# Patient Record
Sex: MALE | Race: WHITE | Employment: FULL TIME | URBAN - METROPOLITAN AREA
[De-identification: names, ages, dates, MRNs, and addresses within clinical notes are randomized per-mention and may not be internally consistent; named-entity substitution may affect disease eponyms.]

---

## 2017-04-21 ENCOUNTER — TELEPHONE (OUTPATIENT)
Dept: INTERNAL MEDICINE CLINIC | Facility: CLINIC | Age: 37
End: 2017-04-21

## 2017-04-21 ENCOUNTER — OFFICE VISIT (OUTPATIENT)
Dept: INTERNAL MEDICINE CLINIC | Facility: CLINIC | Age: 37
End: 2017-04-21

## 2017-04-21 VITALS
WEIGHT: 230.81 LBS | BODY MASS INDEX: 35 KG/M2 | HEART RATE: 96 BPM | TEMPERATURE: 98 F | DIASTOLIC BLOOD PRESSURE: 88 MMHG | SYSTOLIC BLOOD PRESSURE: 133 MMHG

## 2017-04-21 DIAGNOSIS — R10.13 EPIGASTRIC ABDOMINAL PAIN: Primary | ICD-10-CM

## 2017-04-21 PROCEDURE — 99212 OFFICE O/P EST SF 10 MIN: CPT | Performed by: INTERNAL MEDICINE

## 2017-04-21 PROCEDURE — 99214 OFFICE O/P EST MOD 30 MIN: CPT | Performed by: INTERNAL MEDICINE

## 2017-04-21 RX ORDER — FAMOTIDINE 40 MG/1
40 TABLET, FILM COATED ORAL DAILY
Qty: 30 TABLET | Refills: 2 | Status: SHIPPED | OUTPATIENT
Start: 2017-04-21 | End: 2018-04-11

## 2017-04-21 RX ORDER — FAMOTIDINE 40 MG/1
40 TABLET, FILM COATED ORAL NIGHTLY PRN
Qty: 10 TABLET | Refills: 0 | Status: SHIPPED | OUTPATIENT
Start: 2017-04-21 | End: 2020-12-18

## 2017-04-21 NOTE — TELEPHONE ENCOUNTER
I called the patient and recheck that the medication from oxygen was sent to the patient's pharmacy. Apparently he went to the wrong pharmacy. I resend again anyway.

## 2017-04-21 NOTE — PROGRESS NOTES
HPI:    Patient ID: Mina Spurling is a 40year old male. Pt presents to the clinic c/o epigastric abd pain. He had eaten chicken Monday 4/17 when he noted nausea, vomiting and diarrhea later that day.  He ate cream of broccoli soup and pizza this week Allergies   PHYSICAL EXAM:   Physical Exam   Constitutional: He appears well-developed and well-nourished. No distress. HENT:   Head: Normocephalic and atraumatic. Eyes: EOM are normal.   Neck: Normal range of motion.    Pulmonary/Chest: Effort normal. my direction and in my presence. I have reviewed the chart and discharge instructions (if applicable) and agree that the record reflects my personal performance and is accurate and complete.   Adelaida Szymanski MD, 4/21/2017, 4:22 PM

## 2017-07-20 ENCOUNTER — OFFICE VISIT (OUTPATIENT)
Dept: FAMILY MEDICINE CLINIC | Facility: CLINIC | Age: 37
End: 2017-07-20

## 2017-07-20 VITALS
WEIGHT: 220 LBS | TEMPERATURE: 98 F | HEART RATE: 91 BPM | OXYGEN SATURATION: 96 % | DIASTOLIC BLOOD PRESSURE: 78 MMHG | RESPIRATION RATE: 16 BRPM | HEIGHT: 68 IN | BODY MASS INDEX: 33.34 KG/M2 | SYSTOLIC BLOOD PRESSURE: 110 MMHG

## 2017-07-20 DIAGNOSIS — T78.40XA ALLERGIC REACTION, INITIAL ENCOUNTER: ICD-10-CM

## 2017-07-20 DIAGNOSIS — L50.9 HIVES: Primary | ICD-10-CM

## 2017-07-20 PROCEDURE — 99212 OFFICE O/P EST SF 10 MIN: CPT | Performed by: NURSE PRACTITIONER

## 2017-07-20 NOTE — PROGRESS NOTES
CHIEF COMPLAINT:   Patient presents with:  Derm Problem: Hives X 1 day         HPI:   Romel Kumari is a 40year old male who presents for evaluation of a rash. Per patient rash started in the past 1 days. Rash has been worsened since onset.   Patient 1 SKIN: Per HPI. No edema. No ulcerations. HEENT: Denies rhinorrhea, edema of the lips or swelling of throat. CARDIOVASCULAR: Denies chest pains or palpitations. LUNGS: Denies shortness of breath with exertion or rest. No cough or wheezing.   LYMPH: Denies Will treat with second generation antihistamine daily, bendryl at night as directed on packaging, and zantac daily.  Use as directed on packaging, See directions in patient instruction for bendaryl    Discussed with patient to stop offending agent, Do not i · Try to find the cause of the hives and eliminate it. Discuss possible causes with your healthcare provider. Future reactions to the same allergen may be worse. · Don’t scratch the hives. Scratching will delay healing.  To reduce itching, apply cool, wet Call 911 if any of the following occur:  · Swelling of the face, throat, or tongue  · Trouble breathing or swallowing  · Dizziness, weakness, or fainting  Date Last Reviewed: 9/1/2016  © 4498-5041 The [address] [place] [address], If you have hives, watch for symptoms of a severe reaction that affects your entire body. This is called anaphylaxis. Symptoms can include swollen areas of the body, wheezing, trouble breathing or swallowing, and a hoarse voice.  This reaction may happen ri

## 2017-07-20 NOTE — PATIENT INSTRUCTIONS
May take claritin or zyrtec daily    May use benadryl as directed on packaging: do not use while driving, operating heavy machinery, when focus is needed, or mix with alcohol or drugs    Take zantac daily    Do not scratch this can cause secondary infect · You may use over-the counter antihistamines to reduce itching. Some older antihistamines, such as diphenhydramine and chlorpheniramine, are inexpensive. But they need to be taken often and may make you sleepy. They are best used at bedtime.  Don’t use dip Urticaria (hives) are red, itchy, and swollen areas on the skin. They are most often an allergic reaction from eating a food or taking a medicine. Sometimes the cause may be unknown. A single hive can vary in size from a half inch to several inches.  Hives When to call 911  Call 911 right away if you have:  · Swelling in your lips, tongue, or throat, called angioedema  · Trouble breathing or swallowing   Date Last Reviewed: 9/20/2015  © 3524-6884 [address] Tabby Resendiz

## 2018-04-11 ENCOUNTER — OFFICE VISIT (OUTPATIENT)
Dept: FAMILY MEDICINE CLINIC | Facility: CLINIC | Age: 38
End: 2018-04-11

## 2018-04-11 VITALS
SYSTOLIC BLOOD PRESSURE: 128 MMHG | DIASTOLIC BLOOD PRESSURE: 84 MMHG | HEIGHT: 68 IN | BODY MASS INDEX: 36.13 KG/M2 | HEART RATE: 76 BPM | WEIGHT: 238.38 LBS

## 2018-04-11 DIAGNOSIS — Z30.09 VASECTOMY EVALUATION: ICD-10-CM

## 2018-04-11 DIAGNOSIS — H40.059 RAISED INTRAOCULAR PRESSURE, UNSPECIFIED LATERALITY: Primary | ICD-10-CM

## 2018-04-11 PROCEDURE — 99212 OFFICE O/P EST SF 10 MIN: CPT | Performed by: FAMILY MEDICINE

## 2018-04-11 PROCEDURE — 99213 OFFICE O/P EST LOW 20 MIN: CPT | Performed by: FAMILY MEDICINE

## 2018-04-11 RX ORDER — AMMONIUM LACTATE 12 G/100G
CREAM TOPICAL
Qty: 1 TUBE | Refills: 11 | Status: SHIPPED | OUTPATIENT
Start: 2018-04-11 | End: 2020-12-18

## 2018-04-11 NOTE — PROGRESS NOTES
Blood pressure 128/84, pulse 76, height 5' 8\" (1.727 m), weight 238 lb 6 oz (108.1 kg).     Patient presents today for initial visit was told he had elevated intraocular pressure to his optometrist.  He also is interested in his skin tag removal on his lef

## 2018-05-18 ENCOUNTER — OFFICE VISIT (OUTPATIENT)
Dept: FAMILY MEDICINE CLINIC | Facility: CLINIC | Age: 38
End: 2018-05-18

## 2018-05-18 ENCOUNTER — APPOINTMENT (OUTPATIENT)
Dept: LAB | Age: 38
End: 2018-05-18
Attending: FAMILY MEDICINE
Payer: COMMERCIAL

## 2018-05-18 VITALS
SYSTOLIC BLOOD PRESSURE: 133 MMHG | HEIGHT: 68 IN | DIASTOLIC BLOOD PRESSURE: 89 MMHG | WEIGHT: 242 LBS | BODY MASS INDEX: 36.68 KG/M2 | HEART RATE: 76 BPM

## 2018-05-18 DIAGNOSIS — E78.2 MIXED HYPERLIPIDEMIA: ICD-10-CM

## 2018-05-18 DIAGNOSIS — E55.9 VITAMIN D DEFICIENCY: ICD-10-CM

## 2018-05-18 DIAGNOSIS — Z00.00 ROUTINE PHYSICAL EXAMINATION: ICD-10-CM

## 2018-05-18 DIAGNOSIS — E55.9 VITAMIN D DEFICIENCY: Primary | ICD-10-CM

## 2018-05-18 PROCEDURE — 82306 VITAMIN D 25 HYDROXY: CPT

## 2018-05-18 PROCEDURE — 36415 COLL VENOUS BLD VENIPUNCTURE: CPT

## 2018-05-18 PROCEDURE — 84460 ALANINE AMINO (ALT) (SGPT): CPT

## 2018-05-18 PROCEDURE — 90471 IMMUNIZATION ADMIN: CPT | Performed by: FAMILY MEDICINE

## 2018-05-18 PROCEDURE — 90715 TDAP VACCINE 7 YRS/> IM: CPT | Performed by: FAMILY MEDICINE

## 2018-05-18 PROCEDURE — 84443 ASSAY THYROID STIM HORMONE: CPT

## 2018-05-18 PROCEDURE — 80061 LIPID PANEL: CPT

## 2018-05-18 PROCEDURE — 99395 PREV VISIT EST AGE 18-39: CPT | Performed by: FAMILY MEDICINE

## 2018-05-18 PROCEDURE — 84450 TRANSFERASE (AST) (SGOT): CPT

## 2018-05-18 NOTE — PROGRESS NOTES
Blood pressure 133/89, pulse 76, height 5' 8\" (1.727 m), weight 242 lb (109.8 kg). REASON FOR VISIT:    Chidi Raphael is a 45year old male who presents for an [address]. There is no problem list on file for this patient.     Gen Known Allergies  CURRENT MEDICATIONS:     Current Outpatient Prescriptions:  Ammonium Lactate (LAC-HYDRIN) 12 % External Cream APPLY TWICE DAILY Disp: 1 Tube Rfl: 11   famotidine 40 MG Oral Tab Take 1 tablet (40 mg total) by mouth nightly as needed for Hea tenderness  : two descended testes, no masses, no hernia, no penile lesions  RECTAL: deferred  MUSCULOSKELETAL: back is not tender, FROM of the back  EXTREMITIES: no cyanosis, clubbing or edema  NEURO: Oriented times three, cranial nerves are intact, mot VITAMIN D, 25-HYDROXY; Future

## 2018-05-25 ENCOUNTER — OFFICE VISIT (OUTPATIENT)
Dept: FAMILY MEDICINE CLINIC | Facility: CLINIC | Age: 38
End: 2018-05-25

## 2018-05-25 ENCOUNTER — APPOINTMENT (OUTPATIENT)
Dept: LAB | Age: 38
End: 2018-05-25
Attending: FAMILY MEDICINE
Payer: COMMERCIAL

## 2018-05-25 VITALS
HEART RATE: 81 BPM | BODY MASS INDEX: 37 KG/M2 | DIASTOLIC BLOOD PRESSURE: 85 MMHG | TEMPERATURE: 98 F | WEIGHT: 242 LBS | SYSTOLIC BLOOD PRESSURE: 138 MMHG

## 2018-05-25 DIAGNOSIS — E66.9 CLASS 2 OBESITY WITHOUT SERIOUS COMORBIDITY IN ADULT, UNSPECIFIED BMI, UNSPECIFIED OBESITY TYPE: ICD-10-CM

## 2018-05-25 DIAGNOSIS — R74.01 ELEVATED TRANSAMINASE LEVEL: Primary | ICD-10-CM

## 2018-05-25 DIAGNOSIS — R74.01 ELEVATED TRANSAMINASE LEVEL: ICD-10-CM

## 2018-05-25 DIAGNOSIS — E78.2 MIXED HYPERLIPIDEMIA: ICD-10-CM

## 2018-05-25 PROBLEM — E66.812 CLASS 2 OBESITY WITHOUT SERIOUS COMORBIDITY IN ADULT: Status: ACTIVE | Noted: 2018-05-25

## 2018-05-25 PROCEDURE — 99212 OFFICE O/P EST SF 10 MIN: CPT | Performed by: FAMILY MEDICINE

## 2018-05-25 PROCEDURE — 86706 HEP B SURFACE ANTIBODY: CPT

## 2018-05-25 PROCEDURE — 82728 ASSAY OF FERRITIN: CPT

## 2018-05-25 PROCEDURE — 99213 OFFICE O/P EST LOW 20 MIN: CPT | Performed by: FAMILY MEDICINE

## 2018-05-25 PROCEDURE — 87340 HEPATITIS B SURFACE AG IA: CPT

## 2018-05-25 PROCEDURE — 80500 HEPATITIS A B + C PROFILE: CPT

## 2018-05-25 PROCEDURE — 83540 ASSAY OF IRON: CPT

## 2018-05-25 PROCEDURE — 86704 HEP B CORE ANTIBODY TOTAL: CPT

## 2018-05-25 PROCEDURE — 86803 HEPATITIS C AB TEST: CPT

## 2018-05-25 PROCEDURE — 86708 HEPATITIS A ANTIBODY: CPT

## 2018-05-25 PROCEDURE — 84466 ASSAY OF TRANSFERRIN: CPT

## 2018-05-25 PROCEDURE — 36415 COLL VENOUS BLD VENIPUNCTURE: CPT

## 2018-05-25 RX ORDER — PHENTERMINE HYDROCHLORIDE 37.5 MG/1
37.5 TABLET ORAL
Qty: 30 TABLET | Refills: 0 | Status: SHIPPED | OUTPATIENT
Start: 2018-05-25 | End: 2018-07-06

## 2018-05-25 NOTE — PROGRESS NOTES
Blood pressure 138/85, pulse 81, temperature 98 °F (36.7 °C), temperature source Oral, weight 242 lb (109.8 kg). Patient presents today following up for blood test results. Elevated transaminases noted and decreased vitamin D level with hyperlipidemia.

## 2018-06-01 ENCOUNTER — OFFICE VISIT (OUTPATIENT)
Dept: FAMILY MEDICINE CLINIC | Facility: CLINIC | Age: 38
End: 2018-06-01

## 2018-06-01 VITALS
WEIGHT: 242 LBS | HEART RATE: 99 BPM | DIASTOLIC BLOOD PRESSURE: 83 MMHG | BODY MASS INDEX: 36.68 KG/M2 | HEIGHT: 68 IN | SYSTOLIC BLOOD PRESSURE: 129 MMHG

## 2018-06-01 DIAGNOSIS — E66.9 CLASS 2 OBESITY WITHOUT SERIOUS COMORBIDITY IN ADULT, UNSPECIFIED BMI, UNSPECIFIED OBESITY TYPE: ICD-10-CM

## 2018-06-01 DIAGNOSIS — E78.2 MIXED HYPERLIPIDEMIA: ICD-10-CM

## 2018-06-01 DIAGNOSIS — R74.01 ELEVATED TRANSAMINASE LEVEL: Primary | ICD-10-CM

## 2018-06-01 PROCEDURE — 99212 OFFICE O/P EST SF 10 MIN: CPT | Performed by: FAMILY MEDICINE

## 2018-06-01 PROCEDURE — 99213 OFFICE O/P EST LOW 20 MIN: CPT | Performed by: FAMILY MEDICINE

## 2018-06-01 NOTE — PROGRESS NOTES
Blood pressure 129/83, pulse 99, height 5' 8\" (1.727 m), weight 242 lb (109.8 kg). Patient presents today following up for fatty liver and hyperlipidemia. No side effects from the phentermine except for dry mouth which is tolerable.   Patient would [facility]

## 2018-06-07 ENCOUNTER — HOSPITAL ENCOUNTER (OUTPATIENT)
Dept: ULTRASOUND IMAGING | Age: 38
Discharge: HOME | End: 2018-06-07
Attending: FAMILY MEDICINE
Payer: COMMERCIAL

## 2018-06-07 DIAGNOSIS — R74.01 ELEVATED TRANSAMINASE LEVEL: ICD-10-CM

## 2018-06-07 DIAGNOSIS — R79.89 ELEVATED FERRITIN: Primary | ICD-10-CM

## 2018-06-07 PROCEDURE — 76700 US EXAM ABDOM COMPLETE: CPT | Performed by: FAMILY MEDICINE

## 2018-07-06 ENCOUNTER — OFFICE VISIT (OUTPATIENT)
Dept: FAMILY MEDICINE CLINIC | Facility: CLINIC | Age: 38
End: 2018-07-06

## 2018-07-06 ENCOUNTER — TELEPHONE (OUTPATIENT)
Dept: FAMILY MEDICINE CLINIC | Facility: CLINIC | Age: 38
End: 2018-07-06

## 2018-07-06 VITALS
HEIGHT: 68 IN | DIASTOLIC BLOOD PRESSURE: 90 MMHG | HEART RATE: 125 BPM | RESPIRATION RATE: 18 BRPM | SYSTOLIC BLOOD PRESSURE: 140 MMHG | BODY MASS INDEX: 36.98 KG/M2 | WEIGHT: 244 LBS

## 2018-07-06 DIAGNOSIS — E66.9 CLASS 2 OBESITY WITHOUT SERIOUS COMORBIDITY IN ADULT, UNSPECIFIED BMI, UNSPECIFIED OBESITY TYPE: Primary | ICD-10-CM

## 2018-07-06 PROCEDURE — 99212 OFFICE O/P EST SF 10 MIN: CPT | Performed by: FAMILY MEDICINE

## 2018-07-06 PROCEDURE — 99213 OFFICE O/P EST LOW 20 MIN: CPT | Performed by: FAMILY MEDICINE

## 2018-07-06 RX ORDER — PHENTERMINE HYDROCHLORIDE 37.5 MG/1
37.5 TABLET ORAL
Qty: 30 TABLET | Refills: 0 | Status: SHIPPED | OUTPATIENT
Start: 2018-07-06 | End: 2019-04-26

## 2018-07-06 NOTE — PROGRESS NOTES
Blood pressure 140/90, pulse (!) 125, resp. rate 18, height 5' 8\" (1.727 m), weight 244 lb (110.7 kg). Following up for obesity. Denies any symptoms at this point. He got sick  in [place] and discontinue the medication for a while.   He feels fine a

## 2018-07-06 NOTE — TELEPHONE ENCOUNTER
PLEASE CONTACT PATIENT AND ADVISE HIM TO COME TO OFFICE FOR RN VISIT FOR PULSE CHECK WITH RN WHILE ON PHENTERMINE.

## 2018-07-07 NOTE — TELEPHONE ENCOUNTER
LMTCB-Ext [id number]. If pt calls back please help assist with scheduling a nurse visit to check pulse.

## 2018-07-11 ENCOUNTER — OFFICE VISIT (OUTPATIENT)
Dept: FAMILY MEDICINE CLINIC | Facility: CLINIC | Age: 38
End: 2018-07-11

## 2018-07-11 ENCOUNTER — HOSPITAL ENCOUNTER (OUTPATIENT)
Dept: GENERAL RADIOLOGY | Facility: HOSPITAL | Age: 38
Discharge: HOME | End: 2018-07-11
Attending: FAMILY MEDICINE
Payer: COMMERCIAL

## 2018-07-11 ENCOUNTER — LAB ENCOUNTER (OUTPATIENT)
Dept: LAB | Facility: HOSPITAL | Age: 38
End: 2018-07-11
Attending: FAMILY MEDICINE
Payer: COMMERCIAL

## 2018-07-11 VITALS
BODY MASS INDEX: 36.22 KG/M2 | HEIGHT: 68 IN | DIASTOLIC BLOOD PRESSURE: 82 MMHG | HEART RATE: 82 BPM | RESPIRATION RATE: 20 BRPM | WEIGHT: 239 LBS | OXYGEN SATURATION: 96 % | SYSTOLIC BLOOD PRESSURE: 133 MMHG

## 2018-07-11 DIAGNOSIS — K06.8 BLEEDING GUMS: Primary | ICD-10-CM

## 2018-07-11 DIAGNOSIS — R50.9 FEVER AND CHILLS: ICD-10-CM

## 2018-07-11 DIAGNOSIS — K06.8 BLEEDING GUMS: ICD-10-CM

## 2018-07-11 LAB
ALBUMIN SERPL BCP-MCNC: 3.8 G/DL (ref 3.5–4.8)
ALP SERPL-CCNC: 61 U/L (ref 32–100)
ALT SERPL-CCNC: 353 U/L (ref 17–63)
ANION GAP SERPL CALC-SCNC: 8 MMOL/L (ref 0–18)
AST SERPL-CCNC: 209 U/L (ref 15–41)
BASOPHILS # BLD: 0 K/UL (ref 0–0.2)
BASOPHILS NFR BLD: 1 %
BILIRUB DIRECT SERPL-MCNC: 0.1 MG/DL (ref 0–0.2)
BILIRUB SERPL-MCNC: 0.6 MG/DL (ref 0.3–1.2)
BUN SERPL-MCNC: 7 MG/DL (ref 8–20)
BUN/CREAT SERPL: 7.2 (ref 10–20)
CALCIUM SERPL-MCNC: 9.1 MG/DL (ref 8.5–10.5)
CHLORIDE SERPL-SCNC: 100 MMOL/L (ref 95–110)
CO2 SERPL-SCNC: 29 MMOL/L (ref 22–32)
CONTROL LINE PRESENT WITH A CLEAR BACKGROUND (YES/NO): YES YES/NO
CREAT SERPL-MCNC: 0.97 MG/DL (ref 0.5–1.5)
EOSINOPHIL # BLD: 0 K/UL (ref 0–0.7)
EOSINOPHIL NFR BLD: 1 %
ERYTHROCYTE [DISTWIDTH] IN BLOOD BY AUTOMATED COUNT: 14.1 % (ref 11–15)
GLUCOSE SERPL-MCNC: 105 MG/DL (ref 70–99)
HCT VFR BLD AUTO: 46.9 % (ref 41–52)
HGB BLD-MCNC: 15.8 G/DL (ref 13.5–17.5)
INR BLD: 1 (ref 0.9–1.2)
KIT LOT #: NORMAL NUMERIC
LYMPHOCYTES # BLD: 1.4 K/UL (ref 1–4)
LYMPHOCYTES NFR BLD: 39 %
MCH RBC QN AUTO: 27.8 PG (ref 27–32)
MCHC RBC AUTO-ENTMCNC: 33.6 G/DL (ref 32–37)
MCV RBC AUTO: 82.7 FL (ref 80–100)
MONOCYTES # BLD: 0.7 K/UL (ref 0–1)
MONOCYTES NFR BLD: 19 %
NEUTROPHILS # BLD AUTO: 1.5 K/UL (ref 1.8–7.7)
NEUTROPHILS NFR BLD: 41 %
OSMOLALITY UR CALC.SUM OF ELEC: 282 MOSM/KG (ref 275–295)
PLATELET # BLD AUTO: 194 K/UL (ref 140–400)
PMV BLD AUTO: 8.9 FL (ref 7.4–10.3)
POTASSIUM SERPL-SCNC: 4.2 MMOL/L (ref 3.3–5.1)
PROT SERPL-MCNC: 6.9 G/DL (ref 5.9–8.4)
PROTHROMBIN TIME: 12.4 SECONDS (ref 11.8–14.5)
RBC # BLD AUTO: 5.68 M/UL (ref 4.5–5.9)
SODIUM SERPL-SCNC: 137 MMOL/L (ref 136–144)
STREP GRP A CUL-SCR: NEGATIVE
WBC # BLD AUTO: 3.6 K/UL (ref 4–11)

## 2018-07-11 PROCEDURE — 86704 HEP B CORE ANTIBODY TOTAL: CPT | Performed by: FAMILY MEDICINE

## 2018-07-11 PROCEDURE — 99214 OFFICE O/P EST MOD 30 MIN: CPT | Performed by: FAMILY MEDICINE

## 2018-07-11 PROCEDURE — 80076 HEPATIC FUNCTION PANEL: CPT

## 2018-07-11 PROCEDURE — 80500 HEPATITIS A B + C PROFILE: CPT | Performed by: FAMILY MEDICINE

## 2018-07-11 PROCEDURE — 87880 STREP A ASSAY W/OPTIC: CPT | Performed by: FAMILY MEDICINE

## 2018-07-11 PROCEDURE — 99212 OFFICE O/P EST SF 10 MIN: CPT | Performed by: FAMILY MEDICINE

## 2018-07-11 PROCEDURE — 86803 HEPATITIS C AB TEST: CPT | Performed by: FAMILY MEDICINE

## 2018-07-11 PROCEDURE — 86706 HEP B SURFACE ANTIBODY: CPT | Performed by: FAMILY MEDICINE

## 2018-07-11 PROCEDURE — 80048 BASIC METABOLIC PNL TOTAL CA: CPT

## 2018-07-11 PROCEDURE — 71046 X-RAY EXAM CHEST 2 VIEWS: CPT | Performed by: FAMILY MEDICINE

## 2018-07-11 PROCEDURE — 86790 VIRUS ANTIBODY NOS: CPT

## 2018-07-11 PROCEDURE — 86708 HEPATITIS A ANTIBODY: CPT | Performed by: FAMILY MEDICINE

## 2018-07-11 PROCEDURE — 87340 HEPATITIS B SURFACE AG IA: CPT | Performed by: FAMILY MEDICINE

## 2018-07-11 PROCEDURE — 85025 COMPLETE CBC W/AUTO DIFF WBC: CPT

## 2018-07-11 PROCEDURE — 36415 COLL VENOUS BLD VENIPUNCTURE: CPT

## 2018-07-11 PROCEDURE — 85610 PROTHROMBIN TIME: CPT

## 2018-07-11 NOTE — PROGRESS NOTES
Blood pressure 133/82, pulse 82, resp. rate 20, height 5' 8\" (1.727 m), weight 239 lb (108.4 kg), SpO2 96 %. Rash for the past 2-3 days. He reports that he was in [place] for 3 weeks and he returned on July 3.   He was bitten by mosquitoes while he w

## 2018-07-12 LAB
HAV AB SER QL IA: NONREACTIVE
HBV CORE AB SERPL QL IA: NONREACTIVE
HBV SURFACE AB SER-ACNC: 74.49 MIU/ML (ref ?–10)
HBV SURFACE AG SERPL QL IA: NONREACTIVE
HBV SURFACE AG SERPL QL IA: REACTIVE
HCV AB SERPL QL IA: NONREACTIVE

## 2018-07-13 LAB
DENGUE FEVER VIRUS AB, IGG: 1.07 IV
DENGUE FEVER VIRUS AB, IGM: 3.2 IV

## 2018-07-14 ENCOUNTER — TELEPHONE (OUTPATIENT)
Dept: FAMILY MEDICINE CLINIC | Facility: CLINIC | Age: 38
End: 2018-07-14

## 2018-07-14 NOTE — TELEPHONE ENCOUNTER
Spoke to patient verified information and made him aware of results.    Patient stated his rash has gone down and no longer has it on his arms or chest.  Will call and schedule appointment if no improvement

## 2018-07-19 NOTE — TELEPHONE ENCOUNTER
Dr DAIGLE=FYI!!!    Spoke with patient (identified name and ) ,advised Dr Feng Hence note and verbalized understanding, states no more symptoms, feeling fine, totally asymptomatic.     Note      PLEASE ADVISE PATIENT THAT DENGUE TEST WAS POSITIVE AND FIND OUT

## 2018-07-20 NOTE — TELEPHONE ENCOUNTER
All labs and OV were faxed to Dr Sean Wood to fax # [phone] and confirmation has been received.  If any further questions or concerns contact him at phone # [phone]

## 2018-10-05 ENCOUNTER — OFFICE VISIT (OUTPATIENT)
Dept: FAMILY MEDICINE CLINIC | Facility: CLINIC | Age: 38
End: 2018-10-05

## 2018-10-05 VITALS
RESPIRATION RATE: 18 BRPM | HEIGHT: 68 IN | BODY MASS INDEX: 35.92 KG/M2 | HEART RATE: 81 BPM | DIASTOLIC BLOOD PRESSURE: 86 MMHG | WEIGHT: 237 LBS | TEMPERATURE: 98 F | SYSTOLIC BLOOD PRESSURE: 129 MMHG

## 2018-10-05 DIAGNOSIS — E66.9 CLASS 2 OBESITY WITHOUT SERIOUS COMORBIDITY IN ADULT, UNSPECIFIED BMI, UNSPECIFIED OBESITY TYPE: Primary | ICD-10-CM

## 2018-10-05 PROCEDURE — 99213 OFFICE O/P EST LOW 20 MIN: CPT | Performed by: FAMILY MEDICINE

## 2018-10-05 PROCEDURE — 99212 OFFICE O/P EST SF 10 MIN: CPT | Performed by: FAMILY MEDICINE

## 2018-10-05 RX ORDER — PHENTERMINE HYDROCHLORIDE 37.5 MG/1
37.5 CAPSULE ORAL EVERY MORNING
Qty: 30 CAPSULE | Refills: 0 | Status: SHIPPED | OUTPATIENT
Start: 2018-10-05 | End: 2018-11-19

## 2018-10-05 RX ORDER — PHENTERMINE HYDROCHLORIDE 37.5 MG/1
37.5 TABLET ORAL
Qty: 30 TABLET | Refills: 0 | Status: CANCELLED | OUTPATIENT
Start: 2018-10-05

## 2018-10-05 NOTE — PROGRESS NOTES
Blood pressure 129/86, pulse 81, temperature 97.5 °F (36.4 °C), temperature source Oral, resp. rate 18, height 5' 8\" (1.727 m), weight 237 lb (107.5 kg). Patient presents today following up for obesity. Would like to continue using phentermine.   Some

## 2018-11-06 ENCOUNTER — APPOINTMENT (OUTPATIENT)
Dept: LAB | Age: 38
End: 2018-11-06
Attending: FAMILY MEDICINE
Payer: COMMERCIAL

## 2018-11-06 ENCOUNTER — OFFICE VISIT (OUTPATIENT)
Dept: FAMILY MEDICINE CLINIC | Facility: CLINIC | Age: 38
End: 2018-11-06

## 2018-11-06 VITALS
DIASTOLIC BLOOD PRESSURE: 70 MMHG | BODY MASS INDEX: 35.16 KG/M2 | RESPIRATION RATE: 20 BRPM | HEIGHT: 68 IN | SYSTOLIC BLOOD PRESSURE: 130 MMHG | HEART RATE: 111 BPM | WEIGHT: 232 LBS

## 2018-11-06 DIAGNOSIS — R82.90 URINE ABNORMALITY: Primary | ICD-10-CM

## 2018-11-06 DIAGNOSIS — R82.90 URINE ABNORMALITY: ICD-10-CM

## 2018-11-06 PROCEDURE — 99212 OFFICE O/P EST SF 10 MIN: CPT | Performed by: FAMILY MEDICINE

## 2018-11-06 PROCEDURE — 87086 URINE CULTURE/COLONY COUNT: CPT

## 2018-11-06 PROCEDURE — 99213 OFFICE O/P EST LOW 20 MIN: CPT | Performed by: FAMILY MEDICINE

## 2018-11-06 PROCEDURE — 81003 URINALYSIS AUTO W/O SCOPE: CPT

## 2018-11-06 NOTE — PROGRESS NOTES
Blood pressure 130/70, pulse 111, resp. rate 20, height 5' 8\" (1.727 m), weight 232 lb (105.2 kg). Complaining of 2 separate episodes of urinary discoloration. Reports that he feels fine denies dysuria. Denies back pain or fever or chills.   Also foll

## 2018-11-09 ENCOUNTER — TELEPHONE (OUTPATIENT)
Dept: OTHER | Age: 38
End: 2018-11-09

## 2018-11-20 RX ORDER — PHENTERMINE HYDROCHLORIDE 37.5 MG/1
37.5 CAPSULE ORAL EVERY MORNING
Qty: 30 CAPSULE | Refills: 0 | Status: SHIPPED
Start: 2018-11-20 | End: 2019-02-11

## 2019-02-12 RX ORDER — PHENTERMINE HYDROCHLORIDE 37.5 MG/1
CAPSULE ORAL
Qty: 30 CAPSULE | Refills: 0 | OUTPATIENT
Start: 2019-02-12

## 2019-02-12 RX ORDER — PHENTERMINE HYDROCHLORIDE 37.5 MG/1
37.5 CAPSULE ORAL EVERY MORNING
Qty: 30 CAPSULE | Refills: 0 | Status: SHIPPED
Start: 2019-02-12 | End: 2019-04-26

## 2019-02-12 NOTE — TELEPHONE ENCOUNTER
No Protocol on this med. Controlled medication pending for review. If approved needs to be called in or faxed by on-site staff.         Requested Prescriptions     Pending Prescriptions Disp Refills   • Phentermine HCl 37.5 MG Oral Cap 30 capsule 0

## 2019-04-17 RX ORDER — PHENTERMINE HYDROCHLORIDE 37.5 MG/1
37.5 CAPSULE ORAL EVERY MORNING
Qty: 30 CAPSULE | Refills: 0 | OUTPATIENT
Start: 2019-04-17

## 2019-04-26 ENCOUNTER — OFFICE VISIT (OUTPATIENT)
Dept: FAMILY MEDICINE CLINIC | Facility: CLINIC | Age: 39
End: 2019-04-26

## 2019-04-26 VITALS
BODY MASS INDEX: 35.04 KG/M2 | SYSTOLIC BLOOD PRESSURE: 129 MMHG | HEART RATE: 73 BPM | WEIGHT: 231.19 LBS | DIASTOLIC BLOOD PRESSURE: 84 MMHG | HEIGHT: 68 IN

## 2019-04-26 DIAGNOSIS — H52.209 ASTIGMATISM, UNSPECIFIED LATERALITY, UNSPECIFIED TYPE: Primary | ICD-10-CM

## 2019-04-26 PROCEDURE — 99212 OFFICE O/P EST SF 10 MIN: CPT | Performed by: FAMILY MEDICINE

## 2019-04-26 PROCEDURE — 99213 OFFICE O/P EST LOW 20 MIN: CPT | Performed by: FAMILY MEDICINE

## 2019-04-26 RX ORDER — PHENTERMINE HYDROCHLORIDE 37.5 MG/1
37.5 TABLET ORAL
Qty: 30 TABLET | Refills: 2 | Status: SHIPPED | OUTPATIENT
Start: 2019-04-26 | End: 2020-12-18

## 2019-04-26 RX ORDER — PHENTERMINE HYDROCHLORIDE 37.5 MG/1
37.5 TABLET ORAL
Qty: 30 TABLET | Refills: 2 | Status: SHIPPED | OUTPATIENT
Start: 2019-04-26 | End: 2019-04-26

## 2019-04-26 NOTE — PROGRESS NOTES
Blood pressure 129/84, pulse 73, height 5' 8\" (1.727 m), weight 231 lb 3 oz (104.9 kg). Following up for obesity. Feels well. 13 pounds of weight loss in the past 9 months.     History of astigmatism requesting referral    Objective patient is comfortab

## 2019-04-29 ENCOUNTER — TELEPHONE (OUTPATIENT)
Dept: FAMILY MEDICINE CLINIC | Facility: CLINIC | Age: 39
End: 2019-04-29

## 2019-08-09 ENCOUNTER — OFFICE VISIT (OUTPATIENT)
Dept: FAMILY MEDICINE CLINIC | Facility: CLINIC | Age: 39
End: 2019-08-09
Payer: COMMERCIAL

## 2019-08-09 VITALS
SYSTOLIC BLOOD PRESSURE: 124 MMHG | BODY MASS INDEX: 35.4 KG/M2 | DIASTOLIC BLOOD PRESSURE: 81 MMHG | WEIGHT: 233.56 LBS | HEIGHT: 68 IN | HEART RATE: 80 BPM

## 2019-08-09 DIAGNOSIS — R07.89 COSTOCHONDRAL CHEST PAIN: Primary | ICD-10-CM

## 2019-08-09 PROCEDURE — 99213 OFFICE O/P EST LOW 20 MIN: CPT | Performed by: FAMILY MEDICINE

## 2019-08-09 RX ORDER — NAPROXEN 500 MG/1
500 TABLET ORAL 2 TIMES DAILY WITH MEALS
Qty: 60 TABLET | Refills: 1 | Status: SHIPPED | OUTPATIENT
Start: 2019-08-09 | End: 2019-10-08
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
## 2019-10-28 RX ORDER — PHENTERMINE HYDROCHLORIDE 37.5 MG/1
CAPSULE ORAL
Qty: 30 CAPSULE | Refills: 0 | Status: SHIPPED | OUTPATIENT
Start: 2019-10-28 | End: 2020-12-18

## 2019-10-30 NOTE — TELEPHONE ENCOUNTER
Please check with pharmacy that this was sent    [address] [facility] [address], [phone], [phone]   Outpatient Medication Detail      Disp Refills Start End    PHENTERMINE HCL

## 2019-10-31 RX ORDER — PHENTERMINE HYDROCHLORIDE 37.5 MG/1
37.5 TABLET ORAL
Qty: 30 TABLET | Refills: 2 | OUTPATIENT
Start: 2019-10-31

## 2020-12-18 ENCOUNTER — OFFICE VISIT (OUTPATIENT)
Dept: FAMILY MEDICINE CLINIC | Facility: CLINIC | Age: 40
End: 2020-12-18

## 2020-12-18 VITALS
SYSTOLIC BLOOD PRESSURE: 116 MMHG | BODY MASS INDEX: 40.09 KG/M2 | HEIGHT: 68 IN | DIASTOLIC BLOOD PRESSURE: 72 MMHG | HEART RATE: 83 BPM | WEIGHT: 264.5 LBS

## 2020-12-18 DIAGNOSIS — I87.2 VENOUS STASIS DERMATITIS OF BOTH LOWER EXTREMITIES: Primary | ICD-10-CM

## 2020-12-18 PROCEDURE — 3078F DIAST BP <80 MM HG: CPT | Performed by: FAMILY MEDICINE

## 2020-12-18 PROCEDURE — 3074F SYST BP LT 130 MM HG: CPT | Performed by: FAMILY MEDICINE

## 2020-12-18 PROCEDURE — 3008F BODY MASS INDEX DOCD: CPT | Performed by: FAMILY MEDICINE

## 2020-12-18 PROCEDURE — 99213 OFFICE O/P EST LOW 20 MIN: CPT | Performed by: FAMILY MEDICINE

## 2020-12-18 RX ORDER — AMMONIUM LACTATE 12 G/100G
CREAM TOPICAL
Qty: 1 TUBE | Refills: 1 | Status: SHIPPED | OUTPATIENT
Start: 2020-12-18 | End: 2021-04-13

## 2020-12-18 RX ORDER — PHENTERMINE HYDROCHLORIDE 37.5 MG/1
37.5 CAPSULE ORAL
Qty: 30 CAPSULE | Refills: 0 | Status: SHIPPED | OUTPATIENT
Start: 2020-12-18 | End: 2021-01-15

## 2020-12-18 NOTE — PROGRESS NOTES
Blood pressure 116/72, pulse 83, height 5' 8\" (1.727 m), weight 264 lb 8 oz (120 kg). Patient presents today complaining of a rash in the legs bilaterally previously moisturizing cream was helpful. Also interested in weight loss.     Objective hyperpig

## 2021-01-15 RX ORDER — PHENTERMINE HYDROCHLORIDE 37.5 MG/1
37.5 CAPSULE ORAL
Qty: 30 CAPSULE | Refills: 0 | Status: SHIPPED | OUTPATIENT
Start: 2021-01-15 | End: 2021-02-23

## 2021-02-16 RX ORDER — PHENTERMINE HYDROCHLORIDE 37.5 MG/1
37.5 CAPSULE ORAL
Qty: 30 CAPSULE | Refills: 0 | OUTPATIENT
Start: 2021-02-16

## 2021-02-23 RX ORDER — PHENTERMINE HYDROCHLORIDE 37.5 MG/1
37.5 CAPSULE ORAL
Qty: 30 CAPSULE | Refills: 0 | Status: SHIPPED | OUTPATIENT
Start: 2021-02-23 | End: 2021-04-13

## 2021-03-30 RX ORDER — PHENTERMINE HYDROCHLORIDE 37.5 MG/1
37.5 CAPSULE ORAL
Qty: 30 CAPSULE | Refills: 0 | OUTPATIENT
Start: 2021-03-30

## 2021-04-05 RX ORDER — PHENTERMINE HYDROCHLORIDE 37.5 MG/1
37.5 CAPSULE ORAL
Qty: 30 CAPSULE | Refills: 0 | OUTPATIENT
Start: 2021-04-05

## 2021-04-12 RX ORDER — PHENTERMINE HYDROCHLORIDE 37.5 MG/1
37.5 CAPSULE ORAL
Qty: 30 CAPSULE | Refills: 0 | OUTPATIENT
Start: 2021-04-12

## 2021-04-13 ENCOUNTER — OFFICE VISIT (OUTPATIENT)
Dept: FAMILY MEDICINE CLINIC | Facility: CLINIC | Age: 41
End: 2021-04-13

## 2021-04-13 VITALS
SYSTOLIC BLOOD PRESSURE: 132 MMHG | HEART RATE: 89 BPM | HEIGHT: 68 IN | BODY MASS INDEX: 37.03 KG/M2 | DIASTOLIC BLOOD PRESSURE: 78 MMHG | WEIGHT: 244.31 LBS

## 2021-04-13 DIAGNOSIS — R21 RASH: ICD-10-CM

## 2021-04-13 DIAGNOSIS — M79.672 PAIN OF LEFT HEEL: Primary | ICD-10-CM

## 2021-04-13 DIAGNOSIS — R74.01 ELEVATED TRANSAMINASE LEVEL: ICD-10-CM

## 2021-04-13 PROCEDURE — 3078F DIAST BP <80 MM HG: CPT | Performed by: FAMILY MEDICINE

## 2021-04-13 PROCEDURE — 99214 OFFICE O/P EST MOD 30 MIN: CPT | Performed by: FAMILY MEDICINE

## 2021-04-13 PROCEDURE — 3075F SYST BP GE 130 - 139MM HG: CPT | Performed by: FAMILY MEDICINE

## 2021-04-13 PROCEDURE — 3008F BODY MASS INDEX DOCD: CPT | Performed by: FAMILY MEDICINE

## 2021-04-13 RX ORDER — PHENTERMINE HYDROCHLORIDE 37.5 MG/1
37.5 CAPSULE ORAL
Qty: 30 CAPSULE | Refills: 2 | Status: SHIPPED | OUTPATIENT
Start: 2021-04-13 | End: 2021-05-18

## 2021-04-13 NOTE — PATIENT INSTRUCTIONS
Obstructive Sleep Apnea  Obstructive sleep apnea is a condition that causes your air passages to become narrowed or blocked during sleep. As a result, breathing stops for short periods.  Your body wakes up enough for breathing to start again, though you d type of PAP that is best for you. Follow-up care  Follow up with your healthcare provider, or as advised. A diagnosis of sleep apnea is made with a sleep study.  Your healthcare provider can tell you more about this test.   When to seek medical advice  See around this blockage, the throat structures vibrate. This causes the familiar sound of snoring. This snoring sound can be loud and may wake up others, or even themselves, during the night. Snoring gets worse as more and more of the air passage is blocked.

## 2021-04-13 NOTE — PROGRESS NOTES
Blood pressure 132/78, pulse 89, height 5' 8\" (1.727 m), weight 244 lb 5 oz (110.8 kg). Following up for history of obesity. Interested in weight loss also left foot pain for the past several months. Walks at work for 6 hours a day.   Wears steel toe

## 2021-04-16 ENCOUNTER — OFFICE VISIT (OUTPATIENT)
Dept: DERMATOLOGY CLINIC | Facility: CLINIC | Age: 41
End: 2021-04-16

## 2021-04-16 DIAGNOSIS — I78.8 CAPILLARITIS: Primary | ICD-10-CM

## 2021-04-16 PROCEDURE — 99203 OFFICE O/P NEW LOW 30 MIN: CPT | Performed by: PHYSICIAN ASSISTANT

## 2021-04-16 RX ORDER — FLUOCINONIDE 0.5 MG/G
1 OINTMENT TOPICAL 2 TIMES DAILY
Qty: 60 G | Refills: 2 | Status: SHIPPED | OUTPATIENT
Start: 2021-04-16 | End: 2021-10-12

## 2021-04-16 NOTE — PROGRESS NOTES
HPI:    Patient ID: Angelica Mercado is a 39year old male. Patient presents with rash on both legs for the past 1 year. Using a cream which is not helping. He was using lac-hydrin with no relief. He has lost quite a bit of weight.  The rash has spread fr call or follow-up with worsening symptoms or concerns.   -Pt was agreeable to plan and will comply with discussion above. No orders of the defined types were placed in this encounter.       Meds This Visit:  Requested Prescriptions     Signed Prescrip

## 2021-04-17 ENCOUNTER — LAB ENCOUNTER (OUTPATIENT)
Dept: LAB | Facility: HOSPITAL | Age: 41
End: 2021-04-17
Attending: FAMILY MEDICINE
Payer: COMMERCIAL

## 2021-04-17 ENCOUNTER — HOSPITAL ENCOUNTER (OUTPATIENT)
Dept: GENERAL RADIOLOGY | Facility: HOSPITAL | Age: 41
Discharge: HOME | End: 2021-04-17
Attending: FAMILY MEDICINE
Payer: COMMERCIAL

## 2021-04-17 DIAGNOSIS — M79.672 PAIN OF LEFT HEEL: ICD-10-CM

## 2021-04-17 DIAGNOSIS — R74.01 ELEVATED TRANSAMINASE LEVEL: ICD-10-CM

## 2021-04-17 PROCEDURE — 36415 COLL VENOUS BLD VENIPUNCTURE: CPT

## 2021-04-17 PROCEDURE — 82728 ASSAY OF FERRITIN: CPT

## 2021-04-17 PROCEDURE — 83540 ASSAY OF IRON: CPT

## 2021-04-17 PROCEDURE — 73630 X-RAY EXAM OF FOOT: CPT | Performed by: FAMILY MEDICINE

## 2021-04-17 PROCEDURE — 80061 LIPID PANEL: CPT

## 2021-04-17 PROCEDURE — 84466 ASSAY OF TRANSFERRIN: CPT

## 2021-04-17 PROCEDURE — 84443 ASSAY THYROID STIM HORMONE: CPT

## 2021-04-17 PROCEDURE — 80053 COMPREHEN METABOLIC PANEL: CPT

## 2021-04-27 ENCOUNTER — OFFICE VISIT (OUTPATIENT)
Dept: PODIATRY CLINIC | Facility: CLINIC | Age: 41
End: 2021-04-27

## 2021-04-27 DIAGNOSIS — M72.2 PLANTAR FASCIITIS OF LEFT FOOT: Primary | ICD-10-CM

## 2021-04-27 PROCEDURE — L3060 FOOT ARCH SUPP LONGITUD/META: HCPCS | Performed by: PODIATRIST

## 2021-04-27 PROCEDURE — 99243 OFF/OP CNSLTJ NEW/EST LOW 30: CPT | Performed by: PODIATRIST

## 2021-04-27 NOTE — PROGRESS NOTES
HPI:    Patient ID: Rebeka Arzate is a 39year old male. This is a pleasant 51-year-old male presents as a new patient to me on consult from [address] [place] [address]. Patient's chief complaint is left heel pain.   He states he has had this about 4 5 years ago and wa well-informed and I will reevaluate this patient in 2 weeks for further considerations. I discussed the potential for long-term orthotic control based on recurrence.   He is well-informed           ASSESSMENT/PLAN:   Plantar fasciitis of left foot  (primar

## 2021-05-18 RX ORDER — PHENTERMINE HYDROCHLORIDE 37.5 MG/1
37.5 CAPSULE ORAL
Qty: 30 CAPSULE | Refills: 2 | Status: SHIPPED | OUTPATIENT
Start: 2021-05-18 | End: 2021-10-12

## 2021-05-22 ENCOUNTER — HOSPITAL ENCOUNTER (OUTPATIENT)
Dept: ULTRASOUND IMAGING | Facility: HOSPITAL | Age: 41
Discharge: HOME | End: 2021-05-22
Attending: FAMILY MEDICINE
Payer: COMMERCIAL

## 2021-05-22 DIAGNOSIS — R74.01 ELEVATED TRANSAMINASE LEVEL: ICD-10-CM

## 2021-05-22 PROCEDURE — 76705 ECHO EXAM OF ABDOMEN: CPT | Performed by: FAMILY MEDICINE

## 2021-05-26 ENCOUNTER — OFFICE VISIT (OUTPATIENT)
Dept: PODIATRY CLINIC | Facility: CLINIC | Age: 41
End: 2021-05-26

## 2021-05-26 DIAGNOSIS — M72.2 PLANTAR FASCIITIS OF LEFT FOOT: Primary | ICD-10-CM

## 2021-05-26 PROCEDURE — 99213 OFFICE O/P EST LOW 20 MIN: CPT | Performed by: PODIATRIST

## 2021-05-26 NOTE — PROGRESS NOTES
HPI:    Patient ID: Lyndle Skiff is a 39year old male. 51-year-old male presents for follow-up in reference to left heel pain. Patient has had dramatic and significant improvement as a result of present treatment.   He states that he is at least 80%

## 2021-05-28 ENCOUNTER — OFFICE VISIT (OUTPATIENT)
Dept: DERMATOLOGY CLINIC | Facility: CLINIC | Age: 41
End: 2021-05-28

## 2021-05-28 DIAGNOSIS — I78.8 CAPILLARITIS: Primary | ICD-10-CM

## 2021-05-28 PROCEDURE — 99213 OFFICE O/P EST LOW 20 MIN: CPT | Performed by: PHYSICIAN ASSISTANT

## 2021-05-28 NOTE — PROGRESS NOTES
HPI:    Patient ID: Liliam Castaneda is a 39year old male. Patient presents for follow-up on his capillaritis. He has been using fluocinonide 2 times daily with improvement. He used the cream 2 times per day for 2 weeks then 1 time per day since.  He has prescriptions requested or ordered in this encounter       Imaging & Referrals:  None         ID#[id number]

## 2021-10-07 ENCOUNTER — APPOINTMENT (OUTPATIENT)
Dept: INTERVENTIONAL RADIOLOGY/VASCULAR | Facility: HOSPITAL | Age: 41
DRG: 287 | End: 2021-10-07
Attending: INTERNAL MEDICINE
Payer: COMMERCIAL

## 2021-10-07 ENCOUNTER — HOSPITAL ENCOUNTER (INPATIENT)
Facility: HOSPITAL | Age: 41
LOS: 1 days | Discharge: HOME | DRG: 287 | End: 2021-10-08
Attending: EMERGENCY MEDICINE | Admitting: HOSPITALIST
Payer: COMMERCIAL

## 2021-10-07 ENCOUNTER — NURSE TRIAGE (OUTPATIENT)
Dept: FAMILY MEDICINE CLINIC | Facility: CLINIC | Age: 41
End: 2021-10-07

## 2021-10-07 ENCOUNTER — APPOINTMENT (OUTPATIENT)
Dept: GENERAL RADIOLOGY | Facility: HOSPITAL | Age: 41
DRG: 287 | End: 2021-10-07
Attending: EMERGENCY MEDICINE
Payer: COMMERCIAL

## 2021-10-07 DIAGNOSIS — R77.8 ELEVATED TROPONIN: Primary | ICD-10-CM

## 2021-10-07 DIAGNOSIS — R07.9 ACUTE CHEST PAIN: ICD-10-CM

## 2021-10-07 DIAGNOSIS — R00.2 PALPITATIONS: ICD-10-CM

## 2021-10-07 PROBLEM — R79.89 ELEVATED TROPONIN: Status: ACTIVE | Noted: 2021-10-07

## 2021-10-07 PROCEDURE — B2111ZZ FLUOROSCOPY OF MULTIPLE CORONARY ARTERIES USING LOW OSMOLAR CONTRAST: ICD-10-PCS | Performed by: INTERNAL MEDICINE

## 2021-10-07 PROCEDURE — B2151ZZ FLUOROSCOPY OF LEFT HEART USING LOW OSMOLAR CONTRAST: ICD-10-PCS | Performed by: INTERNAL MEDICINE

## 2021-10-07 PROCEDURE — 4A023N7 MEASUREMENT OF CARDIAC SAMPLING AND PRESSURE, LEFT HEART, PERCUTANEOUS APPROACH: ICD-10-PCS | Performed by: INTERNAL MEDICINE

## 2021-10-07 PROCEDURE — 71045 X-RAY EXAM CHEST 1 VIEW: CPT | Performed by: EMERGENCY MEDICINE

## 2021-10-07 PROCEDURE — 99223 1ST HOSP IP/OBS HIGH 75: CPT | Performed by: HOSPITALIST

## 2021-10-07 RX ORDER — HEPARIN SODIUM AND DEXTROSE 10000; 5 [USP'U]/100ML; G/100ML
1000 INJECTION INTRAVENOUS ONCE
Status: CANCELLED | OUTPATIENT
Start: 2021-10-07 | End: 2021-10-07

## 2021-10-07 RX ORDER — MIDAZOLAM HYDROCHLORIDE 1 MG/ML
INJECTION INTRAMUSCULAR; INTRAVENOUS
Status: COMPLETED
Start: 2021-10-07 | End: 2021-10-07

## 2021-10-07 RX ORDER — PROCHLORPERAZINE EDISYLATE 5 MG/ML
5 INJECTION INTRAMUSCULAR; INTRAVENOUS EVERY 8 HOURS PRN
Status: DISCONTINUED | OUTPATIENT
Start: 2021-10-07 | End: 2021-10-08

## 2021-10-07 RX ORDER — SODIUM CHLORIDE 9 MG/ML
INJECTION, SOLUTION INTRAVENOUS
Status: ACTIVE | OUTPATIENT
Start: 2021-10-08 | End: 2021-10-08

## 2021-10-07 RX ORDER — ASPIRIN 325 MG
325 TABLET ORAL DAILY
Status: DISCONTINUED | OUTPATIENT
Start: 2021-10-07 | End: 2021-10-07 | Stop reason: DRUGHIGH

## 2021-10-07 RX ORDER — HEPARIN SODIUM AND DEXTROSE 10000; 5 [USP'U]/100ML; G/100ML
INJECTION INTRAVENOUS CONTINUOUS
Status: CANCELLED | OUTPATIENT
Start: 2021-10-07

## 2021-10-07 RX ORDER — HEPARIN SODIUM AND DEXTROSE 10000; 5 [USP'U]/100ML; G/100ML
INJECTION INTRAVENOUS CONTINUOUS
Status: DISCONTINUED | OUTPATIENT
Start: 2021-10-07 | End: 2021-10-07 | Stop reason: ALTCHOICE

## 2021-10-07 RX ORDER — HEPARIN SODIUM 1000 [USP'U]/ML
5000 INJECTION, SOLUTION INTRAVENOUS; SUBCUTANEOUS ONCE
Status: DISCONTINUED | OUTPATIENT
Start: 2021-10-07 | End: 2021-10-07

## 2021-10-07 RX ORDER — NITROGLYCERIN 20 MG/100ML
INJECTION INTRAVENOUS
Status: COMPLETED
Start: 2021-10-07 | End: 2021-10-07

## 2021-10-07 RX ORDER — HEPARIN SODIUM 1000 [USP'U]/ML
INJECTION, SOLUTION INTRAVENOUS; SUBCUTANEOUS
Status: COMPLETED
Start: 2021-10-07 | End: 2021-10-07

## 2021-10-07 RX ORDER — CHLORHEXIDINE GLUCONATE 4 G/100ML
30 SOLUTION TOPICAL
Status: ACTIVE | OUTPATIENT
Start: 2021-10-08 | End: 2021-10-08

## 2021-10-07 RX ORDER — TEMAZEPAM 7.5 MG/1
15 CAPSULE ORAL NIGHTLY PRN
Status: DISCONTINUED | OUTPATIENT
Start: 2021-10-07 | End: 2021-10-08

## 2021-10-07 RX ORDER — ACETAMINOPHEN 325 MG/1
650 TABLET ORAL EVERY 6 HOURS PRN
Status: DISCONTINUED | OUTPATIENT
Start: 2021-10-07 | End: 2021-10-08

## 2021-10-07 RX ORDER — VERAPAMIL HYDROCHLORIDE 2.5 MG/ML
INJECTION, SOLUTION INTRAVENOUS
Status: COMPLETED
Start: 2021-10-07 | End: 2021-10-07

## 2021-10-07 RX ORDER — ASPIRIN 81 MG/1
324 TABLET, CHEWABLE ORAL ONCE
Status: COMPLETED | OUTPATIENT
Start: 2021-10-07 | End: 2021-10-07

## 2021-10-07 RX ORDER — NITROGLYCERIN 0.4 MG/1
0.4 TABLET SUBLINGUAL EVERY 5 MIN PRN
Status: DISCONTINUED | OUTPATIENT
Start: 2021-10-07 | End: 2021-10-08

## 2021-10-07 RX ORDER — ONDANSETRON 2 MG/ML
4 INJECTION INTRAMUSCULAR; INTRAVENOUS EVERY 6 HOURS PRN
Status: DISCONTINUED | OUTPATIENT
Start: 2021-10-07 | End: 2021-10-08

## 2021-10-07 RX ORDER — LIDOCAINE HYDROCHLORIDE 20 MG/ML
INJECTION, SOLUTION EPIDURAL; INFILTRATION; INTRACAUDAL; PERINEURAL
Status: COMPLETED
Start: 2021-10-07 | End: 2021-10-07

## 2021-10-07 RX ORDER — HEPARIN SODIUM AND DEXTROSE 10000; 5 [USP'U]/100ML; G/100ML
1000 INJECTION INTRAVENOUS ONCE
Status: DISCONTINUED | OUTPATIENT
Start: 2021-10-07 | End: 2021-10-07

## 2021-10-07 RX ORDER — ASPIRIN 81 MG/1
81 TABLET ORAL DAILY
Status: DISCONTINUED | OUTPATIENT
Start: 2021-10-07 | End: 2021-10-08

## 2021-10-07 NOTE — PROCEDURES
Cardiac Cath Procedure Note  Bairdford Haugen Patient Status:  Emergency    1980 MRN [id number]   Location [facility] Attending No att. providers found   [facility] Day # 0 PCP Mark Bowie.  Fausto Prater DO       Cardiologist: Cheryle Pintos placed for LV hemodynamics and LV angiogram .    Specimen sent to: No specimen collected  Estimated blood loss: 5 cc  Closure:  TR band for radial and Mynx for groin      IV was maintained by RN and moderate conscious sedation of versed and fentanyl was gi

## 2021-10-07 NOTE — ED INITIAL ASSESSMENT (HPI)
Pt c/o chest pain this morning on his way to work then while at work he became diaphoretic and had left sided chest pain. Pt states he went to the bathroom and noticed his face was flushed and he was breathing heavy. Pt describes the chest pain as sharp.

## 2021-10-07 NOTE — H&P
[facility]    PATIENT'S NAME: Dasha Bui   ATTENDING PHYSICIAN: Anisa Adkins MD   PATIENT ACCOUNT#:   [de-identified]    LOCATION:  [place] [address]  MEDICAL RECORD #:   [id number]       YOB: 1980  ADMISSION DATE: palate. Eyes:  Anicteric sclerae. Pupils equal, round, reactive. NECK:  Supple. No lymphadenopathy. Trachea midline. Full range of motion. LUNGS:  Clear to auscultation bilaterally. Normal respiratory effort. HEART:  Regular rate, rhythm.   S1 a

## 2021-10-07 NOTE — TELEPHONE ENCOUNTER
Action Requested: Summary for Provider     []  Critical Lab, Recommendations Needed  [] Need Additional Advice  []   FYI    []   Need Orders  [] Need Medications Sent to Pharmacy  []  Other     SUMMARY:Sent to ER  C/c Left chest pain 1st at 6 AM, sweating,

## 2021-10-07 NOTE — ED PROVIDER NOTES
Patient Seen in: [facility] AND [facility] Emergency Department    History   Patient presents with:  Chest Pain    Stated Complaint: chest pain     HPI    55-year-old male without past medical history aside from obesity for which patient previously provided phente drinks      Comment: Socially    Drug use: No      Review of Systems :  Constitutional: As per HPI  Respiratory: Negative for cough and shortness of breath. Cardiovascular: (+) chest pain and palpitations.    Gastrointestinal: Negative for vomiting and a results for these tests on the individual orders.    BASIC METABOLIC PANEL (8)   TROPONIN I   MAGNESIUM   TSH W REFLEX TO FREE T4   RAINBOW DRAW LAVENDER   RAINBOW DRAW LIGHT GREEN   RAINBOW DRAW GOLD   CBC W/ DIFFERENTIAL     EKG    Rate, intervals and axe evaluation without personal hand/facial/oropharyngeal contact and gloves worn throughout encounter. See note and/or contact this provider for further PPE details.     We recommend that you schedule follow up care with a primary care provider within the next

## 2021-10-07 NOTE — CONSULTS
[facility]  Cardiology Consultation    Jihan Leaver Patient Status:  Emergency    1980 MRN [id number]   Location [facility] Attending No att. providers found   [address] Day # 0 PCP Urmila Hauser.  DO Elaine anicteric sclera, neck supple. Neck: No JVD, carotids 2+, no bruits. Cardiac: Regular rate and rhythm. S1, S2 normal. No murmur, pericardial rub, S3.  Lungs: Clear without wheezes, rales, rhonchi or dullness. Normal excursions and effort.   Abdomen: Soft

## 2021-10-08 VITALS
WEIGHT: 230.31 LBS | HEIGHT: 68 IN | DIASTOLIC BLOOD PRESSURE: 86 MMHG | SYSTOLIC BLOOD PRESSURE: 134 MMHG | TEMPERATURE: 98 F | BODY MASS INDEX: 34.91 KG/M2 | RESPIRATION RATE: 16 BRPM | OXYGEN SATURATION: 95 % | HEART RATE: 78 BPM

## 2021-10-08 PROCEDURE — 99239 HOSP IP/OBS DSCHRG MGMT >30: CPT | Performed by: HOSPITALIST

## 2021-10-08 NOTE — PROGRESS NOTES
[facility]     Cardiology Progress Note    Fabi Booker Patient Status:  Inpatient    1980 MRN [id number]   Location [facility] 3W/SW Attending Rizwan Uribe MD   [facility] Day # 1 PCP Cathy Shock.  DO Elaine       SUBJECTIVE: difficulty with urination. Neuro:  Denies any headaches, focal weakness or paresthesia. All other systems reviewed and are negative. Labs:     Recent Labs   Lab 10/07/21  1359 10/08/21  0707   WBC 6.3 6.4   HGB 14.0 14.5   MCV 85.1 84.3   . positive Troponin at 0.052.  10/7/21:  LHC: Normal coroanary arteries. Non cardiac chest pain. Possible pleuritic chest pain. Outpatient echo. · Obesity: BMI: 35.  Weight loss discussion. Summary:   Recent LHC with normal coronaries.  Chest pain non-ca

## 2021-10-08 NOTE — DISCHARGE SUMMARY
[facility] Hospitalist Discharge Summary   Patient ID:  Rocio Dietrich  [id number]  69 year old  2/2/1980    Admit date: 10/7/2021  Discharge date: 10/8/2021  Primary Care Physician: Breezy Jefferson.  Phillip Sutherland DO   Attending Physician: Leann Dobbs MD   Consult Medication List      CONTINUE taking these medications    fluocinonide 0.05 % Oint  Commonly known as: LIDEX  Apply 1 Application topically 2 (two) times daily.  Apply to affected areas 1-2x/day as needed     Phentermine HCl 37.5 MG Caps  Take 1 capsule (

## 2021-10-08 NOTE — PLAN OF CARE
Problem: Patient Centered Care  Goal: Patient preferences are identified and integrated in the patient's plan of care  Description: Interventions:  - What would you like us to know as we care for you? 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    - Provide timely, co

## 2021-10-08 NOTE — PLAN OF CARE
Pt is alert and oriented. Heart cath yesterday, R groin and wrist site intact. Vitals WDL.    Problem: Patient Centered Care  Goal: Patient preferences are identified and integrated in the patient's plan of care  Description: Interventions:  - What would yo

## 2021-10-08 NOTE — PLAN OF CARE
Patient alert and oriented x4, saturating well on room air. Pt's cath sites look unremarkable. Pt cleared for discharge. Pt will be going home with his wife. Flu vaccine given. Tele, IV and ID band were removed.   Discharge instructions, follow-up appo

## 2021-10-08 NOTE — PLAN OF CARE
Received patient from cath lab. Right wrist and right groin both looked unremarkable. Vital signs remained stable. Patient alert and oriented x4, saturating well on room air. Flu vaccination consent obtained and in the chart.   Educated to use the call

## 2021-10-11 ENCOUNTER — PATIENT OUTREACH (OUTPATIENT)
Dept: CASE MANAGEMENT | Age: 41
End: 2021-10-11

## 2021-10-11 DIAGNOSIS — R74.01 ELEVATED TRANSAMINASE LEVEL: ICD-10-CM

## 2021-10-11 DIAGNOSIS — R00.2 PALPITATIONS: ICD-10-CM

## 2021-10-11 DIAGNOSIS — R07.9 ACUTE CHEST PAIN: ICD-10-CM

## 2021-10-11 DIAGNOSIS — R77.8 ELEVATED TROPONIN: ICD-10-CM

## 2021-10-11 DIAGNOSIS — Z02.9 ENCOUNTERS FOR UNSPECIFIED ADMINISTRATIVE PURPOSE: ICD-10-CM

## 2021-10-11 PROCEDURE — 1111F DSCHRG MED/CURRENT MED MERGE: CPT

## 2021-10-11 NOTE — PROGRESS NOTES
Initial Post Discharge Follow Up   Discharge Date: 10/8/21  Contact Date: 10/11/2021    Consent Verification:  Assessment Completed With: Patient  HIPAA Verified?   Yes    Discharge Dx:     Elevated troponin  Palpitations  Acute Chest pain, S/P Cardiac c activities of living as you have prior to your hospital stay (dressing, bathing, ambulating to the bathroom, etc)?  yes  • (NCM) Was patient given a different diet per AVS? no      Medications:   Current Outpatient Medications   Medication Sig Dispense Refi No     Follow up appointments:      Your appointments     Date & Time Appointment Department [facility])    Oct 12, 2021  3:00 PM CDT Hospital Follow Up with Karey Galeazzi, [address] [facility] [address]            Cinthya

## 2021-10-12 ENCOUNTER — OFFICE VISIT (OUTPATIENT)
Dept: FAMILY MEDICINE CLINIC | Facility: CLINIC | Age: 41
End: 2021-10-12

## 2021-10-12 VITALS
BODY MASS INDEX: 35.46 KG/M2 | HEART RATE: 92 BPM | SYSTOLIC BLOOD PRESSURE: 123 MMHG | HEIGHT: 68 IN | WEIGHT: 234 LBS | DIASTOLIC BLOOD PRESSURE: 77 MMHG

## 2021-10-12 DIAGNOSIS — E66.09 CLASS 2 OBESITY DUE TO EXCESS CALORIES WITHOUT SERIOUS COMORBIDITY IN ADULT, UNSPECIFIED BMI: Primary | ICD-10-CM

## 2021-10-12 PROCEDURE — 99213 OFFICE O/P EST LOW 20 MIN: CPT | Performed by: FAMILY MEDICINE

## 2021-10-12 PROCEDURE — 3078F DIAST BP <80 MM HG: CPT | Performed by: FAMILY MEDICINE

## 2021-10-12 PROCEDURE — 3074F SYST BP LT 130 MM HG: CPT | Performed by: FAMILY MEDICINE

## 2021-10-12 PROCEDURE — 3008F BODY MASS INDEX DOCD: CPT | Performed by: FAMILY MEDICINE

## 2021-10-12 RX ORDER — PHENTERMINE HYDROCHLORIDE 15 MG/1
15 CAPSULE ORAL EVERY MORNING
Qty: 30 CAPSULE | Refills: 0 | Status: SHIPPED | OUTPATIENT
Start: 2021-10-12 | End: 2021-11-18

## 2021-10-12 RX ORDER — PHENTERMINE HYDROCHLORIDE 37.5 MG/1
37.5 CAPSULE ORAL
Qty: 30 CAPSULE | Refills: 2 | Status: CANCELLED | OUTPATIENT
Start: 2021-10-12

## 2021-10-12 NOTE — PROGRESS NOTES
Blood pressure 123/77, pulse 92, height 5' 8\" (1.727 m), weight 234 lb (106.1 kg). Patient presents today following up for hospitalization for chest pain and elevated troponins. Coronary arteries were clear on catheterization.   Patient currently feels

## 2021-11-18 RX ORDER — PHENTERMINE HYDROCHLORIDE 15 MG/1
15 CAPSULE ORAL EVERY MORNING
Qty: 30 CAPSULE | Refills: 0 | Status: SHIPPED | OUTPATIENT
Start: 2021-11-18 | End: 2022-02-09

## 2022-02-10 RX ORDER — PHENTERMINE HYDROCHLORIDE 15 MG/1
15 CAPSULE ORAL EVERY MORNING
Qty: 30 CAPSULE | Refills: 0 | Status: SHIPPED | OUTPATIENT
Start: 2022-02-10 | End: 2022-04-04

## 2022-04-04 RX ORDER — PHENTERMINE HYDROCHLORIDE 15 MG/1
15 CAPSULE ORAL EVERY MORNING
Qty: 30 CAPSULE | Refills: 0 | Status: SHIPPED | OUTPATIENT
Start: 2022-04-04

## 2023-11-17 ENCOUNTER — LAB ENCOUNTER (OUTPATIENT)
Dept: LAB | Age: 43
End: 2023-11-17
Attending: FAMILY MEDICINE
Payer: COMMERCIAL

## 2023-11-17 ENCOUNTER — OFFICE VISIT (OUTPATIENT)
Dept: FAMILY MEDICINE CLINIC | Facility: CLINIC | Age: 43
End: 2023-11-17
Payer: COMMERCIAL

## 2023-11-17 VITALS
HEART RATE: 98 BPM | SYSTOLIC BLOOD PRESSURE: 121 MMHG | HEIGHT: 68 IN | DIASTOLIC BLOOD PRESSURE: 85 MMHG | WEIGHT: 287 LBS | BODY MASS INDEX: 43.5 KG/M2

## 2023-11-17 DIAGNOSIS — E66.01 SEVERE OBESITY (BMI >= 40) (HCC): ICD-10-CM

## 2023-11-17 DIAGNOSIS — J01.90 ACUTE NON-RECURRENT SINUSITIS, UNSPECIFIED LOCATION: ICD-10-CM

## 2023-11-17 DIAGNOSIS — J01.90 ACUTE NON-RECURRENT SINUSITIS, UNSPECIFIED LOCATION: Primary | ICD-10-CM

## 2023-11-17 LAB
ALBUMIN SERPL-MCNC: 4.2 G/DL (ref 3.2–4.8)
ALBUMIN/GLOB SERPL: 1.2 {RATIO} (ref 1–2)
ALP LIVER SERPL-CCNC: 82 U/L
ALT SERPL-CCNC: 76 U/L
ANION GAP SERPL CALC-SCNC: 6 MMOL/L (ref 0–18)
AST SERPL-CCNC: 81 U/L (ref ?–34)
BILIRUB SERPL-MCNC: 0.5 MG/DL (ref 0.3–1.2)
BUN BLD-MCNC: 8 MG/DL (ref 9–23)
BUN/CREAT SERPL: 9.5 (ref 10–20)
CALCIUM BLD-MCNC: 9.6 MG/DL (ref 8.7–10.4)
CHLORIDE SERPL-SCNC: 103 MMOL/L (ref 98–112)
CHOLEST SERPL-MCNC: 147 MG/DL (ref ?–200)
CO2 SERPL-SCNC: 30 MMOL/L (ref 21–32)
CREAT BLD-MCNC: 0.84 MG/DL
EGFRCR SERPLBLD CKD-EPI 2021: 111 ML/MIN/1.73M2 (ref 60–?)
EST. AVERAGE GLUCOSE BLD GHB EST-MCNC: 151 MG/DL (ref 68–126)
FASTING PATIENT LIPID ANSWER: YES
FASTING STATUS PATIENT QL REPORTED: YES
GLOBULIN PLAS-MCNC: 3.6 G/DL (ref 2.8–4.4)
GLUCOSE BLD-MCNC: 100 MG/DL (ref 70–99)
HBA1C MFR BLD: 6.9 % (ref ?–5.7)
HBV SURFACE AG SER-ACNC: <0.1 [IU]/L
HBV SURFACE AG SERPL QL IA: NONREACTIVE
HCV AB SERPL QL IA: NONREACTIVE
HDLC SERPL-MCNC: 28 MG/DL (ref 40–59)
LDLC SERPL CALC-MCNC: 95 MG/DL (ref ?–100)
NONHDLC SERPL-MCNC: 119 MG/DL (ref ?–130)
OSMOLALITY SERPL CALC.SUM OF ELEC: 286 MOSM/KG (ref 275–295)
POTASSIUM SERPL-SCNC: 4.4 MMOL/L (ref 3.5–5.1)
PROT SERPL-MCNC: 7.8 G/DL (ref 5.7–8.2)
SODIUM SERPL-SCNC: 139 MMOL/L (ref 136–145)
TRIGL SERPL-MCNC: 134 MG/DL (ref 30–149)
TSI SER-ACNC: 1.59 MIU/ML (ref 0.55–4.78)
VLDLC SERPL CALC-MCNC: 22 MG/DL (ref 0–30)

## 2023-11-17 PROCEDURE — 83036 HEMOGLOBIN GLYCOSYLATED A1C: CPT

## 2023-11-17 PROCEDURE — 84443 ASSAY THYROID STIM HORMONE: CPT

## 2023-11-17 PROCEDURE — 3079F DIAST BP 80-89 MM HG: CPT | Performed by: FAMILY MEDICINE

## 2023-11-17 PROCEDURE — 36415 COLL VENOUS BLD VENIPUNCTURE: CPT

## 2023-11-17 PROCEDURE — 87340 HEPATITIS B SURFACE AG IA: CPT

## 2023-11-17 PROCEDURE — 87389 HIV-1 AG W/HIV-1&-2 AB AG IA: CPT

## 2023-11-17 PROCEDURE — 86780 TREPONEMA PALLIDUM: CPT

## 2023-11-17 PROCEDURE — 80053 COMPREHEN METABOLIC PANEL: CPT

## 2023-11-17 PROCEDURE — 80061 LIPID PANEL: CPT

## 2023-11-17 PROCEDURE — 3074F SYST BP LT 130 MM HG: CPT | Performed by: FAMILY MEDICINE

## 2023-11-17 PROCEDURE — 87591 N.GONORRHOEAE DNA AMP PROB: CPT

## 2023-11-17 PROCEDURE — 86803 HEPATITIS C AB TEST: CPT

## 2023-11-17 PROCEDURE — 87491 CHLMYD TRACH DNA AMP PROBE: CPT

## 2023-11-17 PROCEDURE — 99213 OFFICE O/P EST LOW 20 MIN: CPT | Performed by: FAMILY MEDICINE

## 2023-11-17 PROCEDURE — 3008F BODY MASS INDEX DOCD: CPT | Performed by: FAMILY MEDICINE

## 2023-11-17 RX ORDER — PHENTERMINE HYDROCHLORIDE 15 MG/1
15 CAPSULE ORAL EVERY MORNING
Qty: 30 CAPSULE | Refills: 0 | Status: SHIPPED | OUTPATIENT
Start: 2023-11-17

## 2023-11-17 RX ORDER — AMOXICILLIN AND CLAVULANATE POTASSIUM 875; 125 MG/1; MG/1
1 TABLET, FILM COATED ORAL 2 TIMES DAILY
Qty: 20 TABLET | Refills: 0 | Status: SHIPPED | OUTPATIENT
Start: 2023-11-17 | End: 2023-11-27

## 2023-11-17 NOTE — PROGRESS NOTES
Blood pressure 121/85, pulse 98, height 5' 8\" (1.727 m), weight 287 lb (130.2 kg). Presents today reporting he has had redness of the eyes with discharge in the morning for the past 4 days. Also with some coughing no nocturnal cough no dyspnea. No smoking no asthma no fever. Green nasal congestion pressure in the sinuses and bad breath. Also with weight gain.     Objective erythematous conjunctiva bilaterally PERRLA EOMI    Lungs clear to auscultation no rales rhonchi or wheezes    Throat erythematous no exudate    Assessment sinusitis and conjunctivitis also severe obesity    Plan Augmentin phentermine risks and benefits explained    Fasting blood test ordered follow-up in 1 to 2 weeks for complete physical

## 2023-11-20 ENCOUNTER — TELEPHONE (OUTPATIENT)
Dept: FAMILY MEDICINE CLINIC | Facility: CLINIC | Age: 43
End: 2023-11-20

## 2023-11-20 LAB
C TRACH DNA SPEC QL NAA+PROBE: POSITIVE
N GONORRHOEA DNA SPEC QL NAA+PROBE: NEGATIVE
T PALLIDUM AB SER QL: NEGATIVE

## 2023-11-20 RX ORDER — DOXYCYCLINE HYCLATE 100 MG
100 TABLET ORAL 2 TIMES DAILY
Qty: 14 TABLET | Refills: 0 | Status: SHIPPED | OUTPATIENT
Start: 2023-11-20

## 2023-11-20 NOTE — TELEPHONE ENCOUNTER
Spoke with patient, (  Name and  verified ) informed of Dr. Jake Victoria  instructions below    Patient mentioned he is taking  Augmentin for sinus infection  , advised to check  with his pharmacy before beginning the Doxycycline, advised to abstain from sex until all medication is completed       Patient verbalizes understanding and agrees with plan.

## 2023-11-20 NOTE — TELEPHONE ENCOUNTER
Ciera from [facility] with the following positive result:    Ref Range & Units  Resulting Agency   Chlamydia Trachomatis Amplified RNA  Negative Positive Abnormal

## 2023-11-20 NOTE — TELEPHONE ENCOUNTER
Chlamydia positive please notify patient. Doxycycline prescription at pharmacy. Patient to follow-up in 2 months for follow-up chlamydia testing for cure. All partners should be treated.

## 2023-12-09 ENCOUNTER — LAB ENCOUNTER (OUTPATIENT)
Dept: LAB | Age: 43
End: 2023-12-09
Attending: FAMILY MEDICINE
Payer: COMMERCIAL

## 2023-12-09 DIAGNOSIS — A74.9 CHLAMYDIA: ICD-10-CM

## 2023-12-09 PROCEDURE — 87491 CHLMYD TRACH DNA AMP PROBE: CPT

## 2023-12-09 PROCEDURE — 87591 N.GONORRHOEAE DNA AMP PROB: CPT

## 2023-12-11 LAB
C TRACH DNA SPEC QL NAA+PROBE: NEGATIVE
N GONORRHOEA DNA SPEC QL NAA+PROBE: NEGATIVE

## 2023-12-20 RX ORDER — PHENTERMINE HYDROCHLORIDE 15 MG/1
15 CAPSULE ORAL EVERY MORNING
Qty: 30 CAPSULE | Refills: 0 | Status: SHIPPED | OUTPATIENT
Start: 2023-12-20

## 2023-12-20 NOTE — TELEPHONE ENCOUNTER
Please review. Protocol failed or has no protocol. Requested Prescriptions   Pending Prescriptions Disp Refills    Phentermine HCl 15 MG Oral Cap 30 capsule 0     Sig: Take 1 capsule (15 mg total) by mouth every morning.        There is no refill protocol information for this order          Recent Outpatient Visits              1 month ago Acute non-recurrent sinusitis, unspecified location    [address] [facility], [place], Willy Tapia, DO    Office Visit    2 years ago Class 2 obesity due to excess calories without serious comorbidity in adult, unspecified BMI    [facility], [address], [place], Willy Tapia,     Office Visit    2 years ago [facility], [address] [place] [address]    Office Visit    2 years ago Plantar fasciitis of left foot    [address], [address], Lily Mendieta, CM    Office Visit    2 years ago Plantar fasciitis of left foot    [address], [address], Foster Goetz, CM    Office Visit            Future Appointments         Provider Department Appt Notes    In 1 month Enoc Rodarte DO [address], [address], [place]

## 2024-03-11 DIAGNOSIS — E66.01 SEVERE OBESITY (BMI >= 40) (HCC): ICD-10-CM

## 2024-03-13 RX ORDER — PHENTERMINE HYDROCHLORIDE 15 MG/1
15 CAPSULE ORAL EVERY MORNING
Qty: 30 CAPSULE | Refills: 0 | Status: SHIPPED | OUTPATIENT
Start: 2024-03-13

## 2024-03-13 NOTE — TELEPHONE ENCOUNTER
Please review. Protocol failed or has no protocol.    Requested Prescriptions   Pending Prescriptions Disp Refills    Phentermine HCl 15 MG Oral Cap 30 capsule 0     Sig: Take 1 capsule (15 mg total) by mouth every morning.       Controlled Substance Medication Failed - 3/11/2024  4:05 PM        Failed - This medication is a controlled substance - forward to provider to refill             Recent Outpatient Visits              3 months ago Acute non-recurrent sinusitis, unspecified location    [facility], [address], [place],     Office Visit    2 years ago Class 2 obesity due to excess calories without serious comorbidity in adult, unspecified BMI    [facility], [address], [place],     Office Visit    2 years ago Capillaritis    [facility], [place] Josselyn Cooney PA-C    Office Visit    2 years ago Plantar fasciitis of left foot    [facility], Jose Goetz DPM    Office Visit    2 years ago Plantar fasciitis of left foot    [facility], Jose Goetz DPM    Office Visit            Future Appointments         Provider Department Appt Notes    In 1 month [place],  [facility], [address], [place]  Policy advised

## 2024-04-18 ENCOUNTER — LAB ENCOUNTER (OUTPATIENT)
Dept: LAB | Age: 44
End: 2024-04-18
Attending: FAMILY MEDICINE
Payer: COMMERCIAL

## 2024-04-18 ENCOUNTER — OFFICE VISIT (OUTPATIENT)
Dept: FAMILY MEDICINE CLINIC | Facility: CLINIC | Age: 44
End: 2024-04-18
Payer: COMMERCIAL

## 2024-04-18 VITALS
BODY MASS INDEX: 42.74 KG/M2 | HEIGHT: 68 IN | WEIGHT: 282 LBS | HEART RATE: 86 BPM | SYSTOLIC BLOOD PRESSURE: 127 MMHG | DIASTOLIC BLOOD PRESSURE: 85 MMHG

## 2024-04-18 DIAGNOSIS — E55.9 VITAMIN D DEFICIENCY: ICD-10-CM

## 2024-04-18 DIAGNOSIS — R74.01 ELEVATED TRANSAMINASE LEVEL: ICD-10-CM

## 2024-04-18 DIAGNOSIS — Z00.00 ROUTINE PHYSICAL EXAMINATION: ICD-10-CM

## 2024-04-18 DIAGNOSIS — E66.01 SEVERE OBESITY (BMI >= 40) (HCC): ICD-10-CM

## 2024-04-18 DIAGNOSIS — E78.2 MIXED HYPERLIPIDEMIA: ICD-10-CM

## 2024-04-18 DIAGNOSIS — R19.4 CHANGE IN BOWEL HABITS: ICD-10-CM

## 2024-04-18 DIAGNOSIS — Z00.00 ROUTINE PHYSICAL EXAMINATION: Primary | ICD-10-CM

## 2024-04-18 LAB
ALBUMIN SERPL-MCNC: 4.2 G/DL (ref 3.2–4.8)
ALBUMIN/GLOB SERPL: 1.3 {RATIO} (ref 1–2)
ALP LIVER SERPL-CCNC: 79 U/L
ALT SERPL-CCNC: 123 U/L
ANION GAP SERPL CALC-SCNC: 9 MMOL/L (ref 0–18)
AST SERPL-CCNC: 100 U/L (ref ?–34)
BILIRUB SERPL-MCNC: 0.7 MG/DL (ref 0.3–1.2)
BUN BLD-MCNC: 8 MG/DL (ref 9–23)
BUN/CREAT SERPL: 9.9 (ref 10–20)
CALCIUM BLD-MCNC: 9.6 MG/DL (ref 8.7–10.4)
CHLORIDE SERPL-SCNC: 105 MMOL/L (ref 98–112)
CHOLEST SERPL-MCNC: 159 MG/DL (ref ?–200)
CO2 SERPL-SCNC: 26 MMOL/L (ref 21–32)
CREAT BLD-MCNC: 0.81 MG/DL
EGFRCR SERPLBLD CKD-EPI 2021: 111 ML/MIN/1.73M2 (ref 60–?)
FASTING PATIENT LIPID ANSWER: YES
FASTING STATUS PATIENT QL REPORTED: YES
GLOBULIN PLAS-MCNC: 3.3 G/DL (ref 2.8–4.4)
GLUCOSE BLD-MCNC: 93 MG/DL (ref 70–99)
HBV SURFACE AG SER-ACNC: 0.11 [IU]/L
HBV SURFACE AG SERPL QL IA: NONREACTIVE
HCV AB SERPL QL IA: NONREACTIVE
HDLC SERPL-MCNC: 33 MG/DL (ref 40–59)
LDLC SERPL CALC-MCNC: 107 MG/DL (ref ?–100)
NONHDLC SERPL-MCNC: 126 MG/DL (ref ?–130)
OSMOLALITY SERPL CALC.SUM OF ELEC: 288 MOSM/KG (ref 275–295)
POTASSIUM SERPL-SCNC: 4.4 MMOL/L (ref 3.5–5.1)
PROT SERPL-MCNC: 7.5 G/DL (ref 5.7–8.2)
SODIUM SERPL-SCNC: 140 MMOL/L (ref 136–145)
T PALLIDUM AB SER QL IA: NONREACTIVE
TRIGL SERPL-MCNC: 100 MG/DL (ref 30–149)
TSI SER-ACNC: 2.11 MIU/ML (ref 0.55–4.78)
VIT D+METAB SERPL-MCNC: 9.1 NG/ML (ref 30–100)
VLDLC SERPL CALC-MCNC: 17 MG/DL (ref 0–30)

## 2024-04-18 PROCEDURE — 86780 TREPONEMA PALLIDUM: CPT

## 2024-04-18 PROCEDURE — 36415 COLL VENOUS BLD VENIPUNCTURE: CPT

## 2024-04-18 PROCEDURE — 86803 HEPATITIS C AB TEST: CPT

## 2024-04-18 PROCEDURE — 87591 N.GONORRHOEAE DNA AMP PROB: CPT

## 2024-04-18 PROCEDURE — 82306 VITAMIN D 25 HYDROXY: CPT

## 2024-04-18 PROCEDURE — 99396 PREV VISIT EST AGE 40-64: CPT | Performed by: FAMILY MEDICINE

## 2024-04-18 PROCEDURE — 96127 BRIEF EMOTIONAL/BEHAV ASSMT: CPT | Performed by: FAMILY MEDICINE

## 2024-04-18 PROCEDURE — 87491 CHLMYD TRACH DNA AMP PROBE: CPT

## 2024-04-18 PROCEDURE — 80061 LIPID PANEL: CPT

## 2024-04-18 PROCEDURE — 87340 HEPATITIS B SURFACE AG IA: CPT

## 2024-04-18 PROCEDURE — 3079F DIAST BP 80-89 MM HG: CPT | Performed by: FAMILY MEDICINE

## 2024-04-18 PROCEDURE — 84443 ASSAY THYROID STIM HORMONE: CPT

## 2024-04-18 PROCEDURE — 80053 COMPREHEN METABOLIC PANEL: CPT

## 2024-04-18 PROCEDURE — 87389 HIV-1 AG W/HIV-1&-2 AB AG IA: CPT

## 2024-04-18 PROCEDURE — 3008F BODY MASS INDEX DOCD: CPT | Performed by: FAMILY MEDICINE

## 2024-04-18 PROCEDURE — 3074F SYST BP LT 130 MM HG: CPT | Performed by: FAMILY MEDICINE

## 2024-04-18 RX ORDER — PHENTERMINE HYDROCHLORIDE 15 MG/1
15 CAPSULE ORAL EVERY MORNING
Qty: 30 CAPSULE | Refills: 2 | Status: SHIPPED | OUTPATIENT
Start: 2024-04-18

## 2024-04-18 NOTE — PROGRESS NOTES
REASON FOR VISIT:    Adonay Matthews is a 44 year old male who presents for an Annual Health Assessment.        Patient Active Problem List   Diagnosis    Routine physical examination    Vitamin D deficiency    Mixed hyperlipidemia    Class 2 obesity without serious comorbidity in adult    Elevated transaminase level    Elevated troponin    Palpitations    Acute chest pain    Elevated liver enzymes    ETOH abuse    Pain in ankle joint increased by plantar flexion     General Health     At any time do you feel concerned for the safety/well-being of yourself and/or your children, in your home or elsewhere?: No     CAGE:           Depression Screening (PHQ-2/PHQ-9):  Over the LAST 2 WEEKS             PREVENTATIVE SERVICES  INDICATIONS AND SCHEDULE Recommendation Internal Lab or Procedure   Colonoscopy Screen Every 10 years No recommendations at this time   Flex Sigmoidoscopy Screen  Every 5 years No results found for this or any previous visit.   Fecal Occult Blood  Annually No results found for: \"FOB\", \"OCCULTSTOOL\"   Obesity Screening Screen all adults annually Body mass index is 42.88 kg/m².     Preventive Services for Which Recommendations Vary with Risk Recommendation Internal Lab or Procedure   Cholesterol Screening Recommended screening varies with age, risk and gender LDL Cholesterol (mg/dL)   Date Value   11/17/2023 95      Diabetes Screening  If history of high blood pressure or other  risk factors HgbA1C (%)   Date Value   11/17/2023 6.9 (H)     Glucose (mg/dL)   Date Value   11/17/2023 100 (H)        Gonorrhea Screening If high risk No results found for: \"GONOCOCCUS\"   HIV Screening For all adults age 18-65, older adults at increased risk HIV Antigen Antibody Combo (no units)   Date Value   11/17/2023 Non-Reactive      Syphilis Screening Screen if pregnant or high risk No results found for: \"RPR\"   Hepatitis C Screening Screen pts at high risk plus screen one time for adults born 1945 - 1965 Hepatitis C  Virus (no units)   Date Value   11/17/2023 Nonreactive      Tuberculosis Screen If high risk No components found for: \"PPDINDURAT\"       SPECIFIC DISEASE MONITORING Internal Lab or Procedure   No disease specific diagnoses    ALLERGIES:   No Known Allergies  CURRENT MEDICATIONS:   Current Outpatient Medications   Medication Sig Dispense Refill    Phentermine HCl 15 MG Oral Cap Take 1 capsule (15 mg total) by mouth every morning. 30 capsule 2      MEDICAL INFORMATION:   History reviewed. No pertinent past medical history.   History reviewed. No pertinent surgical history.   Family History   Problem Relation Age of Onset    Other (prediabetes) Father     Other (hyperlipidemia) Father      NO FAMILY HISTORY OF PROSTATE OR COLON CANCER     SOCIAL HISTORY:   Social History     Socioeconomic History    Marital status:    Tobacco Use    Smoking status: Never    Smokeless tobacco: Never   Vaping Use    Vaping status: Never Used   Substance and Sexual Activity    Alcohol use: Yes     Alcohol/week: 0.0 standard drinks of alcohol     Comment: Socially    Drug use: No     Social Determinants of Health     Financial Resource Strain: Low Risk  (10/11/2021)    Received from [facility]    Overall Financial Resource Strain (CARDIA)     Difficulty of Paying Living Expenses: Not hard at all   Transportation Needs: No Transportation Needs (10/11/2021)    [facility]    PRAPARE - Transportation     Lack of Transportation (Medical): No     Lack of Transportation (Non-Medical): No     Occ:  :       REVIEW OF SYSTEMS:   GENERAL: feels well otherwise  SKIN: denies any unusual skin lesions  EYES: denies blurred vision or double vision  HEENT: denies nasal congestion, sinus pain or ST  LUNGS: denies shortness of breath with exertion  CARDIOVASCULAR: denies chest pain on exertion  GI: denies abdominal pain, denies heartburn  : denies nocturia or  changes in stream  MUSCULOSKELETAL: denies back pain  NEURO: denies headaches  PSYCHE: denies depression or anxiety  HEMATOLOGIC: denies hx of anemia  ENDOCRINE: denies thyroid history  ALL/ASTHMA: denies hx of allergy or asthma  NO BLOOD IN STOOL  EXAM:   /85   Pulse 86   Ht 5' 8\" (1.727 m)   Wt 282 lb (127.9 kg)   BMI 42.88 kg/m²   >   BP Readings from Last 3 Encounters:   04/18/24 127/85   11/17/23 121/85   10/12/21 123/77        GENERAL: well developed, well nourished, in no apparent distress, morbidly obese  SKIN: no rashes, no suspicious lesions  HEENT: atraumatic, normocephalic, ears and throat are clear  EYES:PERRLA, EOMI, conjunctiva are clear.    NECK: supple, no adenopathy, no bruits  CHEST: no chest tenderness  LUNGS: clear to auscultation  CARDIO: RRR without murmur  GI: good BS's, no masses, HSM or tenderness  : two descended testes, no masses, no hernia, no penile lesions  RECTAL: deferred  MUSCULOSKELETAL: back is not tender, FROM of the back  EXTREMITIES: no cyanosis, clubbing or edema  NEURO: Oriented times three, cranial nerves are intact, motor and sensory are grossly intact         ASSESSMENT AND OTHER RELEVANT CHRONIC CONDITIONS:   Adonay Matthews is a 44 year old male who presents for an Annual Health Assessment.     PLAN SUMMARY:   Diagnoses and all orders for this visit:    Routine physical examination  -     HCV Antibody; Future  -     Hepatitis B Surface Antigen; Future  -     HIV AG AB Combo; Future  -     T PALLIDUM SCREENING CASCADE; Future  -     Chlamydia/Gc Amplification; Future    Vitamin D deficiency  -     Gastro Referral - [place] ([facility])    Mixed hyperlipidemia  -     Comp Metabolic Panel (14); Future  -     Lipid Panel; Future  -     TSH W Reflex To Free T4; Future  -     Cancel: CT CALCIUM SCORING; Future    Elevated transaminase level  -     Comp Metabolic Panel (14); Future  -     Lipid Panel; Future  -     TSH W Reflex To Free T4; Future  -      Cancel: CT CALCIUM SCORING; Future    Severe obesity (BMI >= 40) ([facility])  -     Phentermine HCl 15 MG Oral Cap; Take 1 capsule (15 mg total) by mouth every morning.    Change in bowel habits  -     Gastro Referral - [facility])       The patient indicates understanding of these issues and agrees to the plan.  Return in about 3 months (around 7/18/2024).  Diet counseling perfomed    SUGGESTED VACCINATIONS - Influenza, Pneumococcal, Zoster, Tetanus, HPV   Influenza: No recommendations at this time  Pneumonia: No recommendations at this time  HPV: No recommendations at this time  Tdap: No recommendations at this time  Shingles:      Influenza Annually   Pneumococcal if high risk   Td/Tdap once then every 10 years   HPV Males 11-21   Zoster (Shingles) 60 and older: one dose   Varicella 2 doses if not immune   MMR 1-2 doses if born after 1956 and not immune     1. Routine physical examination    - HCV Antibody; Future  - Hepatitis B Surface Antigen; Future  - HIV AG AB Combo; Future  - T PALLIDUM SCREENING CASCADE; Future  - Chlamydia/Gc Amplification; Future    2. Vitamin D deficiency  Will follow  - Gastro Referral - [facility])    3. Mixed hyperlipidemia  Labs ordered  - Comp Metabolic Panel (14); Future  - Lipid Panel; Future  - TSH W Reflex To Free T4; Future    4. Elevated transaminase level  Labs ordered  - Comp Metabolic Panel (14); Future  - Lipid Panel; Future  - TSH W Reflex To Free T4; Future    5. Severe obesity (BMI >= 40) ([facility])  Follow-up 3 months  - Phentermine HCl 15 MG Oral Cap; Take 1 capsule (15 mg total) by mouth every morning.  Dispense: 30 capsule; Refill: 2    6. Change in bowel habits  Strains to have bowel movements now for the past several weeks  - Gastro Referral - [facility])

## 2024-04-19 LAB
C TRACH DNA SPEC QL NAA+PROBE: NEGATIVE
N GONORRHOEA DNA SPEC QL NAA+PROBE: NEGATIVE

## 2024-06-03 DIAGNOSIS — E66.01 SEVERE OBESITY (BMI >= 40) (HCC): ICD-10-CM

## 2024-06-06 RX ORDER — PHENTERMINE HYDROCHLORIDE 15 MG/1
15 CAPSULE ORAL EVERY MORNING
Qty: 30 CAPSULE | Refills: 2 | OUTPATIENT
Start: 2024-06-06

## 2024-06-06 NOTE — TELEPHONE ENCOUNTER
Please Review. Protocol Failed; No Protocol     Recent fills: Quantity: 30  04/18/2024 magy  03/13/2024 bernstein   01/25/2024 magy                                                                           Patient is due:   Last Rx written: 04/18/2024  Last Office Visit: 04/18/2024  Recent Visits  Date Type Provider Dept   04/18/24 Office Visit Driss Henry,  [facility]   11/17/23 Office Visit Driss Henry,  [facility]   Showing recent visits within past 540 days with a meds authorizing provider and meeting all other requirements  Future Appointments  Date Type Provider Dept   07/18/24 Appointment Driss Henry DO [facility]   Showing future appointments within next 150 days with a meds authorizing provider and meeting all other requirements      Requested Prescriptions   Pending Prescriptions Disp Refills    Phentermine HCl 15 MG Oral Cap 30 capsule 2     Sig: Take 1 capsule (15 mg total) by mouth every morning.       Controlled Substance Medication Failed - 6/3/2024 12:03 PM        Failed - This medication is a controlled substance - forward to provider to refill               Future Appointments         Provider Department Appt Notes    In 1 month Driss Henry DO [facility], [address], [place] Follow Up          Recent Outpatient Visits              1 month ago Routine physical examination    [facility], [address], [place] Driss Henry,     Office Visit    6 months ago Acute non-recurrent sinusitis, unspecified location    [facility], [address], [place] Driss Henry,     Office Visit    2 years ago Class 2 obesity due to excess calories without serious comorbidity in adult, unspecified BMI    [facility], [address], [place] Driss Henry,     Office Visit    3 years ago Capillaritis    [facility], [place] Josselyn Cooney PA-C     Office Visit    3 years ago Plantar fasciitis of left foot    [facility], [facility], Lily Little, Jose Gao, CM    Office Visit

## 2024-06-07 NOTE — TELEPHONE ENCOUNTER
Edwina Kirk MD   to [facility] Lpn/Lissette     6/6/24  3:47 PM  Prescription on 4/18 was for 3 months, he should have an active refill.  Patient also should follow-up to monitor response to medication    --SENT PT MYCHART MESSAGE REGARDING MESSAGE ABOVE.

## 2024-07-18 ENCOUNTER — LAB ENCOUNTER (OUTPATIENT)
Dept: LAB | Age: 44
End: 2024-07-18
Attending: FAMILY MEDICINE
Payer: COMMERCIAL

## 2024-07-18 ENCOUNTER — OFFICE VISIT (OUTPATIENT)
Dept: FAMILY MEDICINE CLINIC | Facility: CLINIC | Age: 44
End: 2024-07-18

## 2024-07-18 VITALS
HEART RATE: 90 BPM | BODY MASS INDEX: 41.98 KG/M2 | WEIGHT: 277 LBS | DIASTOLIC BLOOD PRESSURE: 86 MMHG | SYSTOLIC BLOOD PRESSURE: 122 MMHG | HEIGHT: 68 IN

## 2024-07-18 DIAGNOSIS — E78.2 MIXED HYPERLIPIDEMIA: ICD-10-CM

## 2024-07-18 DIAGNOSIS — R74.01 ELEVATED TRANSAMINASE LEVEL: ICD-10-CM

## 2024-07-18 DIAGNOSIS — E55.9 VITAMIN D DEFICIENCY: Primary | ICD-10-CM

## 2024-07-18 DIAGNOSIS — E55.9 VITAMIN D DEFICIENCY: ICD-10-CM

## 2024-07-18 DIAGNOSIS — E66.01 SEVERE OBESITY (BMI >= 40) (HCC): ICD-10-CM

## 2024-07-18 LAB
ALBUMIN SERPL-MCNC: 4.4 G/DL (ref 3.2–4.8)
ALP LIVER SERPL-CCNC: 75 U/L
ALT SERPL-CCNC: 75 U/L
AST SERPL-CCNC: 59 U/L (ref ?–34)
BILIRUB DIRECT SERPL-MCNC: 0.3 MG/DL (ref ?–0.3)
BILIRUB SERPL-MCNC: 0.9 MG/DL (ref 0.3–1.2)
DEPRECATED HBV CORE AB SER IA-ACNC: 163 NG/ML
IRON SATN MFR SERPL: 36 %
IRON SERPL-MCNC: 133 UG/DL
PROT SERPL-MCNC: 7.4 G/DL (ref 5.7–8.2)
TIBC SERPL-MCNC: 365 UG/DL (ref 250–425)
TRANSFERRIN SERPL-MCNC: 245 MG/DL (ref 215–365)
VIT D+METAB SERPL-MCNC: 39.6 NG/ML (ref 30–100)

## 2024-07-18 PROCEDURE — 36415 COLL VENOUS BLD VENIPUNCTURE: CPT

## 2024-07-18 PROCEDURE — 84466 ASSAY OF TRANSFERRIN: CPT

## 2024-07-18 PROCEDURE — 82728 ASSAY OF FERRITIN: CPT

## 2024-07-18 PROCEDURE — 3074F SYST BP LT 130 MM HG: CPT | Performed by: FAMILY MEDICINE

## 2024-07-18 PROCEDURE — 3079F DIAST BP 80-89 MM HG: CPT | Performed by: FAMILY MEDICINE

## 2024-07-18 PROCEDURE — 99213 OFFICE O/P EST LOW 20 MIN: CPT | Performed by: FAMILY MEDICINE

## 2024-07-18 PROCEDURE — 83540 ASSAY OF IRON: CPT

## 2024-07-18 PROCEDURE — 3008F BODY MASS INDEX DOCD: CPT | Performed by: FAMILY MEDICINE

## 2024-07-18 PROCEDURE — 82306 VITAMIN D 25 HYDROXY: CPT

## 2024-07-18 PROCEDURE — 80076 HEPATIC FUNCTION PANEL: CPT

## 2024-07-18 RX ORDER — PHENTERMINE HYDROCHLORIDE 15 MG/1
15 CAPSULE ORAL EVERY MORNING
Qty: 30 CAPSULE | Refills: 2 | Status: SHIPPED | OUTPATIENT
Start: 2024-07-18

## 2024-07-18 NOTE — PROGRESS NOTES
Blood pressure 122/86, pulse 90, height 5' 8\" (1.727 m), weight 277 lb (125.6 kg).          Patient presents today following up for obesity and elevated liver enzymes.  Walks 12,000 steps a day at work.  Some heel discomfort.  Otherwise feels fine no chest pain and no dyspnea.    Objective patient comfortable no apparent distress    Assessment obesity elevated liver enzymes with occasional heel discomfort    Plan continue phentermine encouraged increasing exercise heel stretches demonstrated blood test today for vitamin D deficiency elevated liver enzymes will follow

## 2024-07-24 NOTE — TELEPHONE ENCOUNTER
Bed: H04  Expected date:   Expected time:   Means of arrival:   Comments:  TRIAGE   Phentermine HCl 37.5 MG Oral Tab, Take 1 tablet (37.5 mg total) by mouth every morning before breakfast., Disp: 30 tablet, Rfl: 2

## 2024-08-14 NOTE — TELEPHONE ENCOUNTER
Protocol Failed/ No Protocol    Requested Prescriptions   Pending Prescriptions Disp Refills    ERGOCALCIFEROL 1.25 MG (73559 UT) Oral Cap [Pharmacy Med Name: VITAMIN D2 50,000IU (ERGO) CAP RX] 12 capsule 0     Sig: TAKE 1 CAPSULE BY MOUTH 1 TIME A WEEK FOR THE NEXT 3 MONTHS.       There is no refill protocol information for this order          Future Appointments         Provider Department Appt Notes    In 2 months Driss Henry DO [facility], [address], [place] Follow Up          Recent Outpatient Visits              3 weeks ago Vitamin D deficiency    [facility], [address], [place] Driss Henry,     Office Visit    3 months ago Routine physical examination    [facility], [address], [place] Driss Henry,     Office Visit    9 months ago Acute non-recurrent sinusitis, unspecified location    [facility], [address], [place] Driss Henry,     Office Visit    2 years ago Class 2 obesity due to excess calories without serious comorbidity in adult, unspecified BMI    [facility], [address], [place] Driss Henry,     Office Visit    3 years ago Capillaritis    [facility], Josselyn Oquendo PA-C    Office Visit

## 2024-08-15 RX ORDER — ERGOCALCIFEROL 1.25 MG/1
50000 CAPSULE, LIQUID FILLED ORAL WEEKLY
Qty: 12 CAPSULE | Refills: 3 | OUTPATIENT
Start: 2024-08-15

## 2024-09-05 DIAGNOSIS — E66.01 SEVERE OBESITY (BMI >= 40) (HCC): ICD-10-CM

## 2024-09-09 RX ORDER — PHENTERMINE HYDROCHLORIDE 15 MG/1
15 CAPSULE ORAL EVERY MORNING
Qty: 30 CAPSULE | Refills: 0 | OUTPATIENT
Start: 2024-09-09

## 2024-09-09 NOTE — TELEPHONE ENCOUNTER
Please review; protocol failed/ has no protocol      Recent fills: 07/18/2024,06/07/2024,04/18/2024  Last Rx written: 07/18/2024  Last Office Visit: 07/18/2024    Recent Visits  Date Type Provider Dept   07/18/24 Office Visit Driss Henry DO [facility]     Future Appointments  Date Type Provider Dept   10/17/24 Appointment Driss Henry DO [facility]   Showing future appointments within next 150 days with a meds authorizing provider and meeting all other requirements      Requested Prescriptions   Pending Prescriptions Disp Refills    Phentermine HCl 15 MG Oral Cap 30 capsule 2     Sig: Take 1 capsule (15 mg total) by mouth every morning.       Controlled Substance Medication Failed - 9/5/2024  1:57 PM        Failed - This medication is a controlled substance - forward to provider to refill           Recent Outpatient Visits              1 month ago Vitamin D deficiency    [facility], [address], [place] Driss Henry,     Office Visit    4 months [facility]     [facility]    9 months ago Acute non-recurrent sinusitis, unspecified location    [facility], [address], [place] Driss Henry,     Office Visit    2 years ago Class 2 obesity due to excess calories without serious comorbidity in adult, unspecified BMI    [facility], [address], [place] Driss Henry,     Office Visit    3 years ago Capillaritis    [facility], [place] Josselyn Cooney PA-C    Office Visit          Future Appointments         Provider Department Appt Notes    In 1 month Driss Henry DO [facility], [address], [place] Follow Up

## 2024-10-17 ENCOUNTER — OFFICE VISIT (OUTPATIENT)
Dept: FAMILY MEDICINE CLINIC | Facility: CLINIC | Age: 44
End: 2024-10-17

## 2024-10-17 VITALS
DIASTOLIC BLOOD PRESSURE: 86 MMHG | WEIGHT: 258 LBS | SYSTOLIC BLOOD PRESSURE: 124 MMHG | HEIGHT: 68 IN | BODY MASS INDEX: 39.1 KG/M2 | HEART RATE: 64 BPM | RESPIRATION RATE: 17 BRPM

## 2024-10-17 DIAGNOSIS — Z12.11 ENCOUNTER FOR SCREENING COLONOSCOPY: Primary | ICD-10-CM

## 2024-10-17 DIAGNOSIS — E66.01 SEVERE OBESITY (BMI >= 40) (HCC): ICD-10-CM

## 2024-10-17 PROCEDURE — 99213 OFFICE O/P EST LOW 20 MIN: CPT | Performed by: FAMILY MEDICINE

## 2024-10-17 PROCEDURE — 3074F SYST BP LT 130 MM HG: CPT | Performed by: FAMILY MEDICINE

## 2024-10-17 PROCEDURE — 3079F DIAST BP 80-89 MM HG: CPT | Performed by: FAMILY MEDICINE

## 2024-10-17 PROCEDURE — 90471 IMMUNIZATION ADMIN: CPT | Performed by: FAMILY MEDICINE

## 2024-10-17 PROCEDURE — 3008F BODY MASS INDEX DOCD: CPT | Performed by: FAMILY MEDICINE

## 2024-10-17 PROCEDURE — 90656 IIV3 VACC NO PRSV 0.5 ML IM: CPT | Performed by: FAMILY MEDICINE

## 2024-10-17 RX ORDER — PHENTERMINE HYDROCHLORIDE 15 MG/1
15 CAPSULE ORAL EVERY MORNING
Qty: 30 CAPSULE | Refills: 2 | Status: SHIPPED | OUTPATIENT
Start: 2024-10-17

## 2024-10-17 NOTE — PROGRESS NOTES
Blood pressure 124/86, pulse 64, resp. rate 17, height 5' 8\" (1.727 m), weight 258 lb (117 kg).        Following up for obesity with fatty liver.  Reports he decrease his alcohol intake.    No chest pain no dyspnea    Objective weight loss of 19 pounds noted in the past 3 months.    Assessment obesity with fatty liver history of alcohol abuse    Plan patient has not had phentermine for the past month but would like to restart it.  He reports he has decreased his alcohol intake significantly.  I advised him to strictly avoid alcohol given his fatty liver with elevated liver enzymes.  Will continue to monitor for weight loss.  I advised him to follow-up with me in 3 weeks we will check his blood pressure while taking phentermine and phentermine was refilled.

## 2024-11-07 ENCOUNTER — OFFICE VISIT (OUTPATIENT)
Dept: FAMILY MEDICINE CLINIC | Facility: CLINIC | Age: 44
End: 2024-11-07
Payer: COMMERCIAL

## 2024-11-07 VITALS
WEIGHT: 255 LBS | HEIGHT: 68 IN | HEART RATE: 70 BPM | DIASTOLIC BLOOD PRESSURE: 82 MMHG | RESPIRATION RATE: 17 BRPM | SYSTOLIC BLOOD PRESSURE: 126 MMHG | BODY MASS INDEX: 38.65 KG/M2

## 2024-11-07 DIAGNOSIS — E66.09 CLASS 2 OBESITY DUE TO EXCESS CALORIES WITHOUT SERIOUS COMORBIDITY IN ADULT, UNSPECIFIED BMI: Primary | ICD-10-CM

## 2024-11-07 DIAGNOSIS — E66.812 CLASS 2 OBESITY DUE TO EXCESS CALORIES WITHOUT SERIOUS COMORBIDITY IN ADULT, UNSPECIFIED BMI: Primary | ICD-10-CM

## 2024-11-07 PROCEDURE — 3074F SYST BP LT 130 MM HG: CPT | Performed by: FAMILY MEDICINE

## 2024-11-07 PROCEDURE — 3079F DIAST BP 80-89 MM HG: CPT | Performed by: FAMILY MEDICINE

## 2024-11-07 PROCEDURE — 99212 OFFICE O/P EST SF 10 MIN: CPT | Performed by: FAMILY MEDICINE

## 2024-11-07 PROCEDURE — 3008F BODY MASS INDEX DOCD: CPT | Performed by: FAMILY MEDICINE

## 2024-11-07 NOTE — PROGRESS NOTES
Blood pressure 126/82, pulse 70, resp. rate 17, height 5' 8\" (1.727 m), weight 255 lb (115.7 kg).      Following up for obesity.  History of elevated liver enzymes has not had alcohol since last visit.    Objective    Comfortable no apparent distress    Assessment obesity    Plan continue phentermine follow-up in 3 months stay off alcohol

## 2025-02-07 ENCOUNTER — TELEPHONE (OUTPATIENT)
Dept: FAMILY MEDICINE CLINIC | Facility: CLINIC | Age: 45
End: 2025-02-07

## 2025-02-07 ENCOUNTER — LAB ENCOUNTER (OUTPATIENT)
Dept: LAB | Age: 45
End: 2025-02-07
Attending: FAMILY MEDICINE
Payer: COMMERCIAL

## 2025-02-07 ENCOUNTER — OFFICE VISIT (OUTPATIENT)
Dept: FAMILY MEDICINE CLINIC | Facility: CLINIC | Age: 45
End: 2025-02-07

## 2025-02-07 VITALS
DIASTOLIC BLOOD PRESSURE: 86 MMHG | BODY MASS INDEX: 38.04 KG/M2 | HEART RATE: 89 BPM | WEIGHT: 251 LBS | HEIGHT: 68 IN | SYSTOLIC BLOOD PRESSURE: 118 MMHG

## 2025-02-07 DIAGNOSIS — E66.09 CLASS 2 OBESITY DUE TO EXCESS CALORIES WITHOUT SERIOUS COMORBIDITY IN ADULT, UNSPECIFIED BMI: ICD-10-CM

## 2025-02-07 DIAGNOSIS — Z12.11 ENCOUNTER FOR SCREENING COLONOSCOPY: Primary | ICD-10-CM

## 2025-02-07 DIAGNOSIS — E66.812 CLASS 2 OBESITY DUE TO EXCESS CALORIES WITHOUT SERIOUS COMORBIDITY IN ADULT, UNSPECIFIED BMI: ICD-10-CM

## 2025-02-07 DIAGNOSIS — E78.2 MIXED HYPERLIPIDEMIA: ICD-10-CM

## 2025-02-07 LAB
EST. AVERAGE GLUCOSE BLD GHB EST-MCNC: 114 MG/DL (ref 68–126)
HBA1C MFR BLD: 5.6 % (ref ?–5.7)

## 2025-02-07 PROCEDURE — 36415 COLL VENOUS BLD VENIPUNCTURE: CPT

## 2025-02-07 PROCEDURE — 99213 OFFICE O/P EST LOW 20 MIN: CPT | Performed by: FAMILY MEDICINE

## 2025-02-07 PROCEDURE — 84450 TRANSFERASE (AST) (SGOT): CPT

## 2025-02-07 PROCEDURE — 83036 HEMOGLOBIN GLYCOSYLATED A1C: CPT

## 2025-02-07 PROCEDURE — 84460 ALANINE AMINO (ALT) (SGPT): CPT

## 2025-02-07 PROCEDURE — 3074F SYST BP LT 130 MM HG: CPT | Performed by: FAMILY MEDICINE

## 2025-02-07 PROCEDURE — 3079F DIAST BP 80-89 MM HG: CPT | Performed by: FAMILY MEDICINE

## 2025-02-07 PROCEDURE — 3008F BODY MASS INDEX DOCD: CPT | Performed by: FAMILY MEDICINE

## 2025-02-07 PROCEDURE — 80061 LIPID PANEL: CPT

## 2025-02-07 PROCEDURE — 80048 BASIC METABOLIC PNL TOTAL CA: CPT

## 2025-02-07 NOTE — PROGRESS NOTES
Blood pressure 118/86, pulse 89, height 5' 8\" (1.727 m), weight 251 lb (113.9 kg).      Patient presents today following up for obesity.  Also elevated liver enzymes and has decreased his alcohol intake.  Feels well but has had a plateau for weight loss.  Sometimes gets up to 20,000 steps daily on the job.  Other times as little as 15,000.  He reports his feet no longer hurt him  Objective comfortable no apparent distress    Body mass index 38    Assessment obesity    Plan dietary advice given    Blood test today for elevated liver enzymes obesity and hyperlipidemia will follow with results    Colonoscopy compliance encouraged

## 2025-02-07 NOTE — TELEPHONE ENCOUNTER
Patient called (identified name and ), [facility] lab called and left message about his lab orders.  This RN called, lab, spoke with Bernice.  The labs drawn this morning need to be redrawn, as tubes were not spun, and they can only run the A1C at present.  Patient notified and agreed to return to lab tomorrow.  This RN expressed apologies for the inconvenience.

## 2025-02-07 NOTE — PATIENT INSTRUCTIONS
OUTLIVE BY DR. ERNESTO ZUNIGA     (TIME RESTRICTED EATING/  CONTINUOUS GLUCOSE MONITOR)     MICHELL BY DEXCOM

## 2025-02-08 ENCOUNTER — LAB ENCOUNTER (OUTPATIENT)
Dept: LAB | Age: 45
End: 2025-02-08
Attending: FAMILY MEDICINE
Payer: COMMERCIAL

## 2025-02-08 DIAGNOSIS — E66.09 CLASS 2 OBESITY DUE TO EXCESS CALORIES WITHOUT SERIOUS COMORBIDITY IN ADULT, UNSPECIFIED BMI: ICD-10-CM

## 2025-02-08 DIAGNOSIS — E78.2 MIXED HYPERLIPIDEMIA: ICD-10-CM

## 2025-02-08 DIAGNOSIS — E66.812 CLASS 2 OBESITY DUE TO EXCESS CALORIES WITHOUT SERIOUS COMORBIDITY IN ADULT, UNSPECIFIED BMI: ICD-10-CM

## 2025-02-08 LAB
ALT SERPL-CCNC: 42 U/L
ANION GAP SERPL CALC-SCNC: 6 MMOL/L (ref 0–18)
AST SERPL-CCNC: 34 U/L (ref ?–34)
BUN BLD-MCNC: 13 MG/DL (ref 9–23)
BUN/CREAT SERPL: 13.7 (ref 10–20)
CALCIUM BLD-MCNC: 9.1 MG/DL (ref 8.7–10.4)
CHLORIDE SERPL-SCNC: 105 MMOL/L (ref 98–112)
CHOLEST SERPL-MCNC: 157 MG/DL (ref ?–200)
CO2 SERPL-SCNC: 30 MMOL/L (ref 21–32)
CREAT BLD-MCNC: 0.95 MG/DL
EGFRCR SERPLBLD CKD-EPI 2021: 101 ML/MIN/1.73M2 (ref 60–?)
FASTING PATIENT LIPID ANSWER: YES
FASTING STATUS PATIENT QL REPORTED: YES
GLUCOSE BLD-MCNC: 95 MG/DL (ref 70–99)
HDLC SERPL-MCNC: 36 MG/DL (ref 40–59)
LDLC SERPL CALC-MCNC: 104 MG/DL (ref ?–100)
NONHDLC SERPL-MCNC: 121 MG/DL (ref ?–130)
OSMOLALITY SERPL CALC.SUM OF ELEC: 292 MOSM/KG (ref 275–295)
POTASSIUM SERPL-SCNC: 4.3 MMOL/L (ref 3.5–5.1)
SODIUM SERPL-SCNC: 141 MMOL/L (ref 136–145)
TRIGL SERPL-MCNC: 87 MG/DL (ref 30–149)
VLDLC SERPL CALC-MCNC: 15 MG/DL (ref 0–30)

## 2025-02-08 PROCEDURE — 80048 BASIC METABOLIC PNL TOTAL CA: CPT

## 2025-02-08 PROCEDURE — 84450 TRANSFERASE (AST) (SGOT): CPT

## 2025-02-08 PROCEDURE — 36415 COLL VENOUS BLD VENIPUNCTURE: CPT

## 2025-02-08 PROCEDURE — 80061 LIPID PANEL: CPT

## 2025-02-08 PROCEDURE — 84460 ALANINE AMINO (ALT) (SGPT): CPT

## 2025-03-13 DIAGNOSIS — E66.01 SEVERE OBESITY (BMI >= 40) (HCC): ICD-10-CM

## 2025-03-17 RX ORDER — PHENTERMINE HYDROCHLORIDE 15 MG/1
15 CAPSULE ORAL EVERY MORNING
Qty: 30 CAPSULE | Refills: 0 | Status: SHIPPED | OUTPATIENT
Start: 2025-03-17

## 2025-03-17 NOTE — TELEPHONE ENCOUNTER
Please kindly review this medication    [x] FAILS PROTOCOL            [x] Controlled Substance             [] Medication not previously prescribed by Provider            [] Due for appointment- no future appointment scheduled            [] BP reading            [] Labs    [] HAS NO PROTOCOL ATTACHED     Recent fill dates: 2/3/25, 12/28/24, and 10/17/24  Date of  last written prescription: 10/17/24   Last written quantity: #30 each and processed as a 30 day supply  LAST OFFICE VISIT: 2/7/25  [] Takes as needed  [x] Takes scheduled daily    BP Readings from Last 3 Encounters:   02/07/25 118/86   11/07/24 126/82   10/17/24 124/86

## 2025-04-21 DIAGNOSIS — E66.01 SEVERE OBESITY (BMI >= 40) (HCC): ICD-10-CM

## 2025-04-24 RX ORDER — PHENTERMINE HYDROCHLORIDE 15 MG/1
15 CAPSULE ORAL EVERY MORNING
Qty: 30 CAPSULE | Refills: 0 | Status: SHIPPED | OUTPATIENT
Start: 2025-04-24

## 2025-04-24 NOTE — TELEPHONE ENCOUNTER
Please review. Refill failed protocol.   Recent fills each # 30 : 3/17/25 , 2/3/25 , 12/28/2024  Last prescription written: 3/17/25  Last office visit:  2/7/2025    Future Appointments   Date Time Provider Department Center   5/8/2025  8:30 AM [facility]

## 2025-05-08 ENCOUNTER — OFFICE VISIT (OUTPATIENT)
Dept: FAMILY MEDICINE CLINIC | Facility: CLINIC | Age: 45
End: 2025-05-08

## 2025-05-08 VITALS
SYSTOLIC BLOOD PRESSURE: 126 MMHG | HEIGHT: 68 IN | WEIGHT: 253 LBS | BODY MASS INDEX: 38.34 KG/M2 | HEART RATE: 71 BPM | DIASTOLIC BLOOD PRESSURE: 84 MMHG

## 2025-05-08 DIAGNOSIS — Z12.11 ENCOUNTER FOR SCREENING COLONOSCOPY: Primary | ICD-10-CM

## 2025-05-08 DIAGNOSIS — E66.812 CLASS 2 OBESITY DUE TO EXCESS CALORIES WITHOUT SERIOUS COMORBIDITY IN ADULT, UNSPECIFIED BMI: ICD-10-CM

## 2025-05-08 DIAGNOSIS — F43.20 GRIEF REACTION: ICD-10-CM

## 2025-05-08 DIAGNOSIS — E66.09 CLASS 2 OBESITY DUE TO EXCESS CALORIES WITHOUT SERIOUS COMORBIDITY IN ADULT, UNSPECIFIED BMI: ICD-10-CM

## 2025-05-08 PROCEDURE — 3074F SYST BP LT 130 MM HG: CPT | Performed by: FAMILY MEDICINE

## 2025-05-08 PROCEDURE — 3008F BODY MASS INDEX DOCD: CPT | Performed by: FAMILY MEDICINE

## 2025-05-08 PROCEDURE — 99213 OFFICE O/P EST LOW 20 MIN: CPT | Performed by: FAMILY MEDICINE

## 2025-05-08 PROCEDURE — 3079F DIAST BP 80-89 MM HG: CPT | Performed by: FAMILY MEDICINE

## 2025-05-08 RX ORDER — LISDEXAMFETAMINE DIMESYLATE 40 MG/1
40 CAPSULE ORAL EVERY MORNING
Qty: 30 CAPSULE | Refills: 0 | Status: SHIPPED | OUTPATIENT
Start: 2025-05-08

## 2025-05-08 NOTE — PROGRESS NOTES
Blood pressure 126/84, pulse 71, height 5' 8\" (1.727 m), weight 253 lb (114.8 kg).      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 WALKS FOR EXERCISE.    SLEEPING WELL NO PANIC ATTACKS.   OBJECTIVE     TEARFUL OTHERWISE APPROPRIATE MOOD AND AFFECT.    BMI 38.47    ASSESSMENT OBESITY AND GRIEF REACTION    PLAN STOP PHENTERMINE START VYVANSE ALSO REFERRAL FOR THERAPY PATIENT DOES NOT WANT MEDICATION FOR DEPRESSIVE SYMPTOMS AT THIS TIME    FU ONE WEEK.

## 2025-05-13 ENCOUNTER — PATIENT MESSAGE (OUTPATIENT)
Dept: FAMILY MEDICINE CLINIC | Facility: CLINIC | Age: 45
End: 2025-05-13

## 2025-05-13 DIAGNOSIS — E66.01 SEVERE OBESITY (BMI >= 40) (HCC): ICD-10-CM

## 2025-05-15 RX ORDER — PHENTERMINE HYDROCHLORIDE 15 MG/1
15 CAPSULE ORAL EVERY MORNING
Qty: 30 CAPSULE | Refills: 0 | Status: SHIPPED | OUTPATIENT
Start: 2025-05-15

## 2025-05-21 ENCOUNTER — NURSE ONLY (OUTPATIENT)
Facility: CLINIC | Age: 45
End: 2025-05-21

## 2025-05-21 DIAGNOSIS — Z12.11 COLON CANCER SCREENING: Primary | ICD-10-CM

## 2025-05-21 NOTE — PROGRESS NOTES
Called patient for scheduled TCS/FIT+ result.   Medications, pharmacy, and allergies reviewed.   Please advise on colonoscopy and bowel prep orders.     Age 45-76 y/o:   › MD preference: None   › Insurance:  [facility] BLUE ADVANTAGE O   › Last PCP visit: 4/18/2025   Pcp within [facility]: Driss Henry DO   › Last CBC: 10/8/2021  › Date of positive FIT: n/a  › H/W/BMI: 68 in / 282 lbs / 42.88 kg/m²     Special comments/notes:    Telephone Colon Screening Questionnaire Yes No   Are you currently experiencing any new/abnormal GI symptoms? [] [x]   If yes, explain:                              Rectal bleeding?            [] [x]   Black stool?              [] [x]   Dysphagia or food \"feeling stuck\" when eating?    [] [x]   Intractable vomiting?          [] [x]   Unexplained weight loss?                        [] [x]   First colonoscopy?                         [x] []   Family history of colon cancer?           [] [x]   Any issues with anesthesia?                   [] [x]   If yes, explain:                                   Any recent complaints of chest pain or shortness of breath?  [] [x]   Referred to a cardiologist?  [] [x]   If yes, explain:             Stroke, MI (heart attack) or stent placement in the last 12 months:  [] [x]   History of  respiratory issues/oxygen/BERENICE/COPD: [] [x]   If yes, CPAP/BiPAP:                                    History of devices (pacemaker/defibrillator) [] [x]   History of cardiac/CVA/(MI/stroke):           [] [x]     Medications Yes  No   Anticoagulants (except Aspirin):                [] [x]   Diabetic Meds                                                   PO: Hold day before and day of procedure  Insulin:                                    [] [x]   Weight loss meds (phentermine/vyvanse/saxsenda/etc): Phentermine                   [x] []   Iron/herbal/multivitamin supplement:                          [] [x]   Usage of marijuana, CBD &/or vape products:                         [] [x]

## 2025-05-21 NOTE — PROGRESS NOTES
GI Staff:  TCS Colon Screening Orders    Please schedule: Colonoscopy [id number] with MAC     Please send split dose Golytely bowel prep     Diagnosis: Colon Screening Z12.11     Medication adjustments:  Hold Phentermine 7 days prior to procedure     >>>Please inform patient if new medications are started after scheduling procedure they need to call clinic to notify us.

## 2025-05-27 NOTE — PROGRESS NOTES
Scheduled for:  Colonoscopy [id number]  Provider Name:    Date:  9/12/2025  Location:  [facility]  Sedation:  MAC  Time:  (pt is aware that ENDO will call the day before to confirm arrival time)  Prep:  Golytely  Meds/Allergies Reconciled?: Physician reviewed  Diagnosis with codes:  Screening for Colon Cancer Z12.11  Was patient informed to call insurance with codes (Y/N): Yes  Referral sent?:  Referral was sent at the time of electronic surgical scheduling.  EM or [facility] notified?: I sent an electronic request to Endo Scheduling and received a confirmation today.    Medication Orders:  Patient is aware to hole Phentermine 7 days prior to procedure  Misc Orders: Patient is aware to NOT take iron pills, herbal meds and diet supplements for 7 days before exam. Also to NOT take any form of alcohol, recreational drugs and any forms of ED meds 24-72 hours before exam.        Further instructions given by staff: I discussed the prep instructions with the patient which he verbally understood and is aware that I will send prep instructions today via inTarvo.

## 2025-05-27 NOTE — PROGRESS NOTES
3RD ATTEMPT Letter sent out to patient's home       Left message for patient to call back to schedule Colonoscopy.     Please transfer call to GI surgery scheduling

## 2025-06-23 DIAGNOSIS — E66.01 SEVERE OBESITY (BMI >= 40) (HCC): ICD-10-CM

## 2025-06-24 RX ORDER — PHENTERMINE HYDROCHLORIDE 15 MG/1
15 CAPSULE ORAL EVERY MORNING
Qty: 30 CAPSULE | Refills: 0 | OUTPATIENT
Start: 2025-06-24

## 2025-06-24 NOTE — TELEPHONE ENCOUNTER
Outpatient Medication Detail     Disp Refills Start End    Phentermine HCl 15 MG Oral Cap 30 capsule 0 5/15/2025 --    Sig - Route: Take 1 capsule (15 mg total) by mouth every morning. - Oral    Sent to pharmacy as: Phentermine HCl 15 MG Oral Capsule    E-Prescribing Status: Receipt confirmed by pharmacy (5/15/2025  8:53 AM CDT)      Associated Diagnoses    Severe obesity (BMI >= 40) ([facility])        Pharmacy    [id number] - [facility] [address] AT SEC OF [facility], [phone], [phone]

## 2025-08-20 DIAGNOSIS — E66.01 SEVERE OBESITY (BMI >= 40) (HCC): ICD-10-CM

## 2025-08-25 RX ORDER — PHENTERMINE HYDROCHLORIDE 15 MG/1
15 CAPSULE ORAL EVERY MORNING
Qty: 30 CAPSULE | Refills: 0 | Status: SHIPPED | OUTPATIENT
Start: 2025-08-25

## (undated) NOTE — MR AVS SNAPSHOT
[facility] [address] [place] [address]  [address]  [id number] [phone]               Thank you for choosing us for your health care visit with Anastasiya Gibbons MD.  We are glad to serve you and happy to provide you with this summary of yo not sign up before the expiration date, you must request a new code. Your unique [employer] Access Code: [email or web address]  Expires: 6/20/2017  4:23 PM    If you have questions, you can call [phone] to talk to our [facility] Staff.  Remember, [employer]

## (undated) NOTE — Clinical Note
JONYI, TCM call made, see notes. NCM confirmed with patient that he has a HFU on 10/12/2021 at 3:00 pm, NCM changed visit type to TCM HFU and changed appointment to 2:50 pm to accommodate a 30 minute appointment as required for TCM.

## (undated) NOTE — LETTER
[facility] 3W/SW  [address] [address]  Dept: [phone]  Loc: [phone]      October 8, 2021    Patient: Chinyere Zamora   Date of Visit: 10/7/2021       To Whom It May Concern:    Chinyere Zamora was seen and treated in o

## (undated) NOTE — LETTER
[facility] 3W/SW  [address] [address]  [address]: [phone]     Date:  10/8/2021     Patient:  [facility].  [address], [place] [address] [address]  ?  10/08/21  ? Re: Winter Najera  ?   To Whom

## (undated) NOTE — LETTER
5/27/2025    Adonay Matthews  [address] [facility] [address]         Dear Adonay,    This letter is to inform you that our office has made several attempts to reach you by phone without success.  We were attempting to contact you by phone regarding SCHEDULING your COLONOSCOPY     Please contact our office at the number listed below as soon as you receive this letter to discuss this issue and to make the necessary changes in our system to your contact information.  Thank you for your cooperation.        Sincerely,    Nurse  [facility] [address] [facility] [address]  [facility] [address]  Ph: [phone]  Fax: [phone]

## (undated) NOTE — LETTER
April 27, 2021         Rizwanmitch Dunham   [address] [place] [address] [address] [facility]      Patient: Teo Owusu   YOB: 1980   Date of Visit: 4/27/2021       Dear Dr. Graciela Robles saw your patient, Teo Owusu, on 4/27/2